# Patient Record
Sex: MALE | Race: WHITE | NOT HISPANIC OR LATINO | Employment: FULL TIME | URBAN - NONMETROPOLITAN AREA
[De-identification: names, ages, dates, MRNs, and addresses within clinical notes are randomized per-mention and may not be internally consistent; named-entity substitution may affect disease eponyms.]

---

## 2017-05-10 ENCOUNTER — COMMUNICATION - GICH (OUTPATIENT)
Dept: FAMILY MEDICINE | Facility: OTHER | Age: 32
End: 2017-05-10

## 2017-05-10 DIAGNOSIS — I10 ESSENTIAL (PRIMARY) HYPERTENSION: ICD-10-CM

## 2017-12-07 ENCOUNTER — OFFICE VISIT - GICH (OUTPATIENT)
Dept: FAMILY MEDICINE | Facility: OTHER | Age: 32
End: 2017-12-07

## 2017-12-07 ENCOUNTER — HISTORY (OUTPATIENT)
Dept: FAMILY MEDICINE | Facility: OTHER | Age: 32
End: 2017-12-07

## 2017-12-07 ENCOUNTER — HOSPITAL ENCOUNTER (OUTPATIENT)
Dept: RADIOLOGY | Facility: OTHER | Age: 32
End: 2017-12-07
Attending: NURSE PRACTITIONER

## 2017-12-07 DIAGNOSIS — S69.91XA UNSPECIFIED INJURY OF RIGHT WRIST, HAND AND FINGER(S), INITIAL ENCOUNTER: ICD-10-CM

## 2017-12-07 DIAGNOSIS — S62.639A CLOSED DISPLACED FRACTURE OF DISTAL PHALANX OF FINGER: ICD-10-CM

## 2018-01-05 NOTE — TELEPHONE ENCOUNTER
Patient Information     Patient Name MRN Sex Jax Baker [id number] Male 1985      Telephone Encounter by Jax Vora RN at 2017  9:35 AM     Author:  Jax Vora RN Service:  (none) Author Type:  NURS- Registered Nurse     Filed:  2017  9:45 AM Encounter Date:  5/10/2017 Status:  Signed     :  Jax Vora RN (NURS- Registered Nurse)            This is a Refill request from: [facility]  Name of Medication: Lisinopril  Quantity requested: 90 tabs  Last fill date: 16 per rx request  Due for refill: Yes, per rx request  Last visit with ANMOL FRANKI was on: 2016 in [facility]  PCP:  No Pcp  Controlled Substance Agreement:  N/A   Diagnosis r/t this medication request: Hypertension    Chart review shows that requested rx was discontinued in chart on 10/19/16 as patient states no longer taking med. Last office visit with PCP noted to be on 16, for an establish care visit. Lisinopril was continued at that office visit. Will send patient a reminder letter that he should be seen for an annual office visit, as well as route limited refill to PCP for his consideration/approval.     Unable to complete prescription refill per RN Medication Refill Policy.................... Jax Vora RN ....................  2017   9:37 AM

## 2018-01-10 ENCOUNTER — COMMUNICATION - GICH (OUTPATIENT)
Dept: FAMILY MEDICINE | Facility: OTHER | Age: 33
End: 2018-01-10

## 2018-01-10 DIAGNOSIS — I10 ESSENTIAL (PRIMARY) HYPERTENSION: ICD-10-CM

## 2018-01-30 ENCOUNTER — DOCUMENTATION ONLY (OUTPATIENT)
Dept: FAMILY MEDICINE | Facility: OTHER | Age: 33
End: 2018-01-30

## 2018-01-30 PROBLEM — E66.9 OBESITY: Status: ACTIVE | Noted: 2018-01-30

## 2018-01-30 RX ORDER — EPINEPHRINE 0.3 MG/.3ML
0.3 INJECTION SUBCUTANEOUS
COMMUNITY
Start: 2017-09-09

## 2018-01-30 RX ORDER — LISINOPRIL 10 MG/1
1 TABLET ORAL DAILY
COMMUNITY
Start: 2018-01-11 | End: 2020-07-07

## 2018-02-09 VITALS
HEIGHT: 70 IN | WEIGHT: 272.25 LBS | SYSTOLIC BLOOD PRESSURE: 140 MMHG | TEMPERATURE: 96.6 F | BODY MASS INDEX: 38.98 KG/M2 | HEART RATE: 88 BPM | DIASTOLIC BLOOD PRESSURE: 80 MMHG

## 2018-02-12 NOTE — PROGRESS NOTES
Patient Information     Patient Name MRN Sex Jax Baker [id number] Male 1985      Progress Notes by Laura Jackman NP at 2017  6:15 PM     Author:  Laura Jackman NP Service:  (none) Author Type:  PHYS- Nurse Practitioner     Filed:  2017  7:46 PM Encounter Date:  2017 Status:  Signed     :  Laura Jackman NP (PHYS- Nurse Practitioner)            Nursing Notes:   Gloria Alcaraz  2017  6:43 PM  Signed  Patient presents to the clinic for right ring finger injury. Patient reports his finger getting smashed by a log around 11:30 this morning.   Gloria GOODWIN CMA.......2017..6:23 PM      SUBJECTIVE:    Jax Rojo is a 32 y.o. male who presents for finger pain    Hand Injury    Incident onset: 1130 today, 17. The incident occurred at work (He states it happened at work today, but is not going to file w/c). The injury mechanism was a direct blow (Finger crushed under a log). The pain is present in the right fingers. The quality of the pain is described as aching and shooting. The pain radiates to the right hand. The pain is at a severity of 8/10. The pain has been constant since the incident. Pertinent negatives include no chest pain, muscle weakness, numbness or tingling. Associated symptoms comments: Finger swelling and pain, wants the pressure relieved. . The symptoms are aggravated by palpation, lifting and movement. He has tried nothing for the symptoms. The treatment provided no relief.       Current Outpatient Prescriptions on File Prior to Visit       Medication  Sig Dispense Refill     lisinopril (PRINIVIL; ZESTRIL) 10 mg tablet TAKE 1 TABLET BY MOUTH EVERY DAY. 90 tablet 0     No current facility-administered medications on file prior to visit.        REVIEW OF SYSTEMS:  Review of Systems   Cardiovascular: Negative for chest pain.   Neurological: Negative for tingling and numbness.       OBJECTIVE:  /80  Pulse 88  Temp 96.6  F (35.9  C)  "(Tympanic)  Ht 1.778 m (5' 10\")  Wt 123.5 kg (272 lb 4 oz)  BMI 39.06 kg/m2    EXAM:   Physical Exam   Constitutional: He is well-developed, well-nourished, and in no distress.   HENT:   Head: Normocephalic and atraumatic.   Eyes: Conjunctivae are normal.   Neck: Neck supple.   Cardiovascular: Normal rate.    Pulmonary/Chest: Effort normal. No respiratory distress.   Musculoskeletal:        Right hand: He exhibits decreased range of motion, tenderness and swelling. Normal sensation noted. Decreased strength noted.        Hands:  Neurological: He is alert.   Skin: Skin is warm and dry. No rash noted.   Psychiatric: Mood and affect normal.   Nursing note and vitals reviewed.    Completed Finger xray.  I personally reviewed the xray. There was a tuft fracture of the 4th finger, distal end upon initial read of xray.  Final read pending by radiology.    ASSESSMENT/PLAN:    ICD-10-CM    1. Injury of finger of right hand, initial encounter S69.91XA XR FINGER 3 VIEWS RIGHT   2. Closed fracture of tuft of distal phalanx of finger S62.639A         Plan:  Finger splinted. Home cares and OTC gone over. Discussed with him there was no way to relieve the pressure in the finger. If the nail was opened up, then the fracture would be an open fracture leading to potential infection and sepsis. I am not going to do open the nail up. I also advised him to leave the nail alone. This injury did happen at work, but he was not going to file it under w/c.       SHANNAN QUINONES NP ....................  12/7/2017   7:45 PM            "

## 2018-02-12 NOTE — PATIENT INSTRUCTIONS
Patient Information     Patient Name MRN Sex Jax Baker [id number] Male 1985      Patient Instructions by Laura Jackman NP at 2017  6:15 PM     Author:  Laura Jackman NP Service:  (none) Author Type:  PHYS- Nurse Practitioner     Filed:  2017  7:09 PM Encounter Date:  2017 Status:  Signed     :  Laura Jackman NP (PHYS- Nurse Practitioner)            Finger Fracture   ________________________________________________________________________  KEY POINTS    A finger fracture is a crack or break in one or more of the bones in a finger.    Treatment may include a splint, cast, janee-taping, or surgery, and special exercises to help your finger get stronger and more flexible.    Follow the full course of treatment your healthcare provider prescribes.  ________________________________________________________________________  What is a finger fracture?   A finger fracture is a crack or break in one or more of the bones in a finger. The break may be just a bend or small crack in the bone, or the bone may break into pieces or shatter. Some fractures may stick out through the skin.  What is the cause?  A broken finger usually happens from:    Hitting or being hit by a hard object    Getting a finger slammed in a door    Falling onto the hand  A fracture may also be the result of a medical condition that causes weak or brittle bones.  What are the symptoms?   Symptoms may include:    A snapping or popping sound at the time of the injury    Pain, swelling, bruising, or tenderness that happens right after the injury    Pain when the injured area is touched    Pain or swelling that keeps you from bending or using your finger    An area of the finger that is cold, pale, or numb    A change in the shape of the finger  How is it diagnosed?   Your healthcare provider will ask about your symptoms and how the injury happened. Your provider will examine you. Tests may include:    X-rays  "of the finger    CT scan, which uses X-rays and a computer to show detailed pictures of the bones    MRI, which uses a strong magnetic field and radio waves to show detailed pictures of the bones  How is it treated?   The treatment depends on the type of fracture.     If you have an open wound with the fracture, you may need treatment to control bleeding or prevent infection.    If the broken bone is crooked, your healthcare provider will straighten it. You will be given medicine first so the straightening is less painful.    Sometimes surgery is needed to put the bones back into the right position.    Your healthcare provider may put a splint or cast on your finger, or the finger may be \"janee taped\" to the finger next to it.  With treatment, the fracture may take 4 to 6 weeks to heal. You may need to do special exercises to help your finger get stronger and more flexible. Ask your healthcare provider about this.  How can I take care of myself?  Follow the full course of treatment your healthcare provider prescribes. Also:     To keep swelling down and help relieve pain, your healthcare provider may tell you to:    Put an ice pack, gel pack, or package of frozen vegetables wrapped in a cloth on the injured area every 3 to 4 hours for up to 20 minutes at a time for the first day or two after the injury.    Keep the hand up on pillows so that it is above the level of your heart when you sit or lie down. Your provider may also recommend using a sling to keep the hand up while you do your daily activities.    Take pain medicine, such as ibuprofen, as directed by your provider. Nonsteroidal anti-inflammatory medicines (NSAIDs), such as ibuprofen, may cause stomach bleeding and other problems. These risks increase with age. Read the label and take as directed. Unless recommended by your healthcare provider, you should not take this medicine for more than 10 days.  Ask your healthcare provider:    How and when you will " get your test results    How long it will take to recover    If there are activities you should avoid and when you can return to your normal activities    How to take care of yourself at home    What symptoms or problems you should watch for and what to do if you have them  Make sure you know when you should come back for a checkup. Keep all appointments for provider visits or tests.  How can I help prevent a finger fracture?  Most broken fingers are caused by accidents that are not easy to prevent.

## 2018-02-12 NOTE — TELEPHONE ENCOUNTER
Patient Information     Patient Name MRN Sex Jax Baker [id number] Male 1985      Telephone Encounter by Jax Vora RN at 2018  2:18 PM     Author:  Jax Vora RN Service:  (none) Author Type:  NURS- Registered Nurse     Filed:  2018  2:25 PM Encounter Date:  1/10/2018 Status:  Signed     :  Jax Vora RN (NURS- Registered Nurse)            Ace Inhibitors    Office visit in the past 12 months or per provider note.    Last visit with FRANKI COREA was on: 2016 in [facility]  Next visit with FRANKI COREA is on: No future appointment listed with this provider  Next visit with Family Practice is on: No future appointment listed in this department    Lab test requirements:  Creatinine and Potassium annually, if ordering lab, order BMP.  CREATININE (mg/dL)    Date Value   2016 0.77     POTASSIUM (mmol/L)    Date Value   2016 4.0     Max refill for 12 months from last office visit or per provider note    Chart review shows that patient has not had labwork or seen PCP in 24 months to support rx as requested. Was sent a reminder letter with last refill of rx as requested on 5/10/17. No appointment is noted to be scheduled at this time. Call placed to patient to discuss. Patient was unavailable at this time. Writer will laureano up rx as requested and will route rx request to PCP for his consideration/approval at this time.    Unable to complete prescription refill per RN Medication Refill Policy.................... Jax Vora RN ....................  2018   2:24 PM

## 2018-02-12 NOTE — NURSING NOTE
Patient Information     Patient Name MRN Sex Jax Baker [id number] Male 1985      Nursing Note by Gloria Alcaraz at 2017  6:15 PM     Author:  Gloria Alcaraz Service:  (none) Author Type:  (none)     Filed:  2017  6:43 PM Encounter Date:  2017 Status:  Signed     :  Gloria Alcaraz            Patient presents to the clinic for right ring finger injury. Patient reports his finger getting smashed by a log around 11:30 this morning.   Gloria GOODWIN, EMBER.......2017..6:23 PM

## 2018-06-05 DIAGNOSIS — B18.2 CHRONIC HEPATITIS C WITHOUT HEPATIC COMA (H): Primary | ICD-10-CM

## 2018-06-10 ENCOUNTER — APPOINTMENT (OUTPATIENT)
Dept: GENERAL RADIOLOGY | Facility: OTHER | Age: 33
End: 2018-06-10
Attending: INTERNAL MEDICINE
Payer: COMMERCIAL

## 2018-06-10 ENCOUNTER — HOSPITAL ENCOUNTER (EMERGENCY)
Facility: OTHER | Age: 33
Discharge: HOME | End: 2018-06-10
Attending: INTERNAL MEDICINE | Admitting: INTERNAL MEDICINE
Payer: COMMERCIAL

## 2018-06-10 VITALS
OXYGEN SATURATION: 96 % | DIASTOLIC BLOOD PRESSURE: 99 MMHG | TEMPERATURE: 98 F | RESPIRATION RATE: 14 BRPM | SYSTOLIC BLOOD PRESSURE: 165 MMHG

## 2018-06-10 DIAGNOSIS — B18.2 CHRONIC HEPATITIS C WITHOUT HEPATIC COMA (H): ICD-10-CM

## 2018-06-10 DIAGNOSIS — S42.022A CLOSED DISPLACED FRACTURE OF SHAFT OF LEFT CLAVICLE, INITIAL ENCOUNTER: ICD-10-CM

## 2018-06-10 DIAGNOSIS — T07.XXXA MULTIPLE ABRASIONS: ICD-10-CM
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
## 2018-06-10 LAB
ALBUMIN SERPL-MCNC: 4.4 G/DL (ref 3.5–5.7)
ALP SERPL-CCNC: 81 U/L (ref 34–104)
ALT SERPL W P-5'-P-CCNC: 42 U/L (ref 7–52)
ANION GAP SERPL CALCULATED.3IONS-SCNC: 8 MMOL/L (ref 3–14)
APTT PPP: 25 SEC (ref 29–50)
AST SERPL W P-5'-P-CCNC: 30 U/L (ref 13–39)
BASOPHILS # BLD AUTO: 0 10E9/L (ref 0–0.2)
BASOPHILS NFR BLD AUTO: 0.1 %
BILIRUB SERPL-MCNC: 0.4 MG/DL (ref 0.3–1)
BUN SERPL-MCNC: 11 MG/DL (ref 7–25)
CALCIUM SERPL-MCNC: 9.1 MG/DL (ref 8.6–10.3)
CHLORIDE SERPL-SCNC: 102 MMOL/L (ref 98–107)
CO2 SERPL-SCNC: 25 MMOL/L (ref 21–31)
CREAT SERPL-MCNC: 0.97 MG/DL (ref 0.7–1.3)
DIFFERENTIAL METHOD BLD: ABNORMAL
EOSINOPHIL # BLD AUTO: 0.1 10E9/L (ref 0–0.7)
EOSINOPHIL NFR BLD AUTO: 0.3 %
ERYTHROCYTE [DISTWIDTH] IN BLOOD BY AUTOMATED COUNT: 11.9 % (ref 10–15)
ETHANOL SERPL-MCNC: <0.01 %
GFR SERPL CREATININE-BSD FRML MDRD: 89 ML/MIN/1.7M2
GLUCOSE SERPL-MCNC: 153 MG/DL (ref 70–105)
HCT VFR BLD AUTO: 43.5 % (ref 40–53)
HGB BLD-MCNC: 15.5 G/DL (ref 13.3–17.7)
IMM GRANULOCYTES # BLD: 0.2 10E9/L (ref 0–0.4)
IMM GRANULOCYTES NFR BLD: 1.2 %
INR PPP: 1.06 (ref 0–1.3)
LYMPHOCYTES # BLD AUTO: 1.6 10E9/L (ref 0.8–5.3)
LYMPHOCYTES NFR BLD AUTO: 10.4 %
MCH RBC QN AUTO: 33.3 PG (ref 26.5–33)
MCHC RBC AUTO-ENTMCNC: 35.6 G/DL (ref 31.5–36.5)
MCV RBC AUTO: 93 FL (ref 78–100)
MONOCYTES # BLD AUTO: 0.7 10E9/L (ref 0–1.3)
MONOCYTES NFR BLD AUTO: 4.4 %
NEUTROPHILS # BLD AUTO: 12.4 10E9/L (ref 1.6–8.3)
NEUTROPHILS NFR BLD AUTO: 83.6 %
PLATELET # BLD AUTO: 162 10E9/L (ref 150–450)
POTASSIUM SERPL-SCNC: 3.7 MMOL/L (ref 3.5–5.1)
PROT SERPL-MCNC: 7.2 G/DL (ref 6.4–8.9)
RBC # BLD AUTO: 4.66 10E12/L (ref 4.4–5.9)
SODIUM SERPL-SCNC: 135 MMOL/L (ref 134–144)
WBC # BLD AUTO: 14.9 10E9/L (ref 4–11)

## 2018-06-10 PROCEDURE — 90471 IMMUNIZATION ADMIN: CPT | Performed by: INTERNAL MEDICINE

## 2018-06-10 PROCEDURE — 96375 TX/PRO/DX INJ NEW DRUG ADDON: CPT | Performed by: INTERNAL MEDICINE

## 2018-06-10 PROCEDURE — 85610 PROTHROMBIN TIME: CPT | Performed by: INTERNAL MEDICINE

## 2018-06-10 PROCEDURE — 25000128 H RX IP 250 OP 636: Performed by: INTERNAL MEDICINE

## 2018-06-10 PROCEDURE — 80053 COMPREHEN METABOLIC PANEL: CPT | Performed by: INTERNAL MEDICINE

## 2018-06-10 PROCEDURE — 85730 THROMBOPLASTIN TIME PARTIAL: CPT | Performed by: INTERNAL MEDICINE

## 2018-06-10 PROCEDURE — 90714 TD VACC NO PRESV 7 YRS+ IM: CPT | Performed by: INTERNAL MEDICINE

## 2018-06-10 PROCEDURE — 96374 THER/PROPH/DIAG INJ IV PUSH: CPT | Performed by: INTERNAL MEDICINE

## 2018-06-10 PROCEDURE — 73030 X-RAY EXAM OF SHOULDER: CPT | Mod: LT

## 2018-06-10 PROCEDURE — 99285 EMERGENCY DEPT VISIT HI MDM: CPT | Mod: 25 | Performed by: INTERNAL MEDICINE

## 2018-06-10 PROCEDURE — 25000132 ZZH RX MED GY IP 250 OP 250 PS 637: Performed by: INTERNAL MEDICINE

## 2018-06-10 PROCEDURE — 73630 X-RAY EXAM OF FOOT: CPT | Mod: LT

## 2018-06-10 PROCEDURE — 99285 EMERGENCY DEPT VISIT HI MDM: CPT | Mod: Z6 | Performed by: INTERNAL MEDICINE

## 2018-06-10 PROCEDURE — 71045 X-RAY EXAM CHEST 1 VIEW: CPT

## 2018-06-10 PROCEDURE — 25000125 ZZHC RX 250: Performed by: INTERNAL MEDICINE

## 2018-06-10 PROCEDURE — 85025 COMPLETE CBC W/AUTO DIFF WBC: CPT | Performed by: INTERNAL MEDICINE

## 2018-06-10 PROCEDURE — 80320 DRUG SCREEN QUANTALCOHOLS: CPT | Performed by: INTERNAL MEDICINE

## 2018-06-10 PROCEDURE — 36415 COLL VENOUS BLD VENIPUNCTURE: CPT | Performed by: INTERNAL MEDICINE

## 2018-06-10 PROCEDURE — 96376 TX/PRO/DX INJ SAME DRUG ADON: CPT | Performed by: INTERNAL MEDICINE

## 2018-06-10 RX ORDER — FENTANYL CITRATE 50 UG/ML
100 INJECTION, SOLUTION INTRAMUSCULAR; INTRAVENOUS ONCE
Status: COMPLETED | OUTPATIENT
Start: 2018-06-10 | End: 2018-06-10

## 2018-06-10 RX ORDER — ONDANSETRON 2 MG/ML
4 INJECTION INTRAMUSCULAR; INTRAVENOUS ONCE
Status: COMPLETED | OUTPATIENT
Start: 2018-06-10 | End: 2018-06-10

## 2018-06-10 RX ORDER — MUPIROCIN 20 MG/G
OINTMENT TOPICAL ONCE
Status: COMPLETED | OUTPATIENT
Start: 2018-06-10 | End: 2018-06-10

## 2018-06-10 RX ORDER — FENTANYL CITRATE 50 UG/ML
50 INJECTION, SOLUTION INTRAMUSCULAR; INTRAVENOUS ONCE
Status: DISCONTINUED | OUTPATIENT
Start: 2018-06-10 | End: 2018-06-10

## 2018-06-10 RX ORDER — GINSENG 100 MG
500 CAPSULE ORAL ONCE
Status: DISCONTINUED | OUTPATIENT
Start: 2018-06-10 | End: 2018-06-10

## 2018-06-10 RX ORDER — FENTANYL CITRATE 50 UG/ML
100 INJECTION, SOLUTION INTRAMUSCULAR; INTRAVENOUS ONCE
Status: DISCONTINUED | OUTPATIENT
Start: 2018-06-10 | End: 2018-06-11 | Stop reason: HOSPADM

## 2018-06-10 RX ORDER — CEPHALEXIN 500 MG/1
500 CAPSULE ORAL 3 TIMES DAILY
Qty: 30 CAPSULE | Refills: 0 | Status: SHIPPED | OUTPATIENT
Start: 2018-06-10 | End: 2018-10-01

## 2018-06-10 RX ORDER — FENTANYL CITRATE 50 UG/ML
100 INJECTION, SOLUTION INTRAMUSCULAR; INTRAVENOUS ONCE
Status: DISCONTINUED | OUTPATIENT
Start: 2018-06-10 | End: 2018-06-10

## 2018-06-10 RX ADMIN — CEPHALEXIN 500 MG: 250 CAPSULE ORAL at 21:45

## 2018-06-10 RX ADMIN — LIDOCAINE HYDROCHLORIDE 20 ML: 20 JELLY TOPICAL at 20:25

## 2018-06-10 RX ADMIN — ONDANSETRON 4 MG: 2 INJECTION INTRAMUSCULAR; INTRAVENOUS at 19:43

## 2018-06-10 RX ADMIN — MUPIROCIN: 20 OINTMENT TOPICAL at 21:24

## 2018-06-10 RX ADMIN — Medication 3 ML: at 20:35

## 2018-06-10 RX ADMIN — FENTANYL CITRATE 100 MCG: 50 INJECTION, SOLUTION INTRAMUSCULAR; INTRAVENOUS at 19:42

## 2018-06-10 RX ADMIN — CLOSTRIDIUM TETANI TOXOID ANTIGEN (FORMALDEHYDE INACTIVATED) AND CORYNEBACTERIUM DIPHTHERIAE TOXOID ANTIGEN (FORMALDEHYDE INACTIVATED) 0.5 ML: 5; 2 INJECTION, SUSPENSION INTRAMUSCULAR at 19:45

## 2018-06-10 RX ADMIN — FENTANYL CITRATE 100 MCG: 50 INJECTION, SOLUTION INTRAMUSCULAR; INTRAVENOUS at 20:25

## 2018-06-10 ASSESSMENT — ENCOUNTER SYMPTOMS
WOUND: 1
BRUISES/BLEEDS EASILY: 0
ADENOPATHY: 0
CONFUSION: 0
AGITATION: 0
ABDOMINAL PAIN: 0
BACK PAIN: 0
HEMATURIA: 0
SHORTNESS OF BREATH: 0
FATIGUE: 0
NECK PAIN: 0
FEVER: 0
HEADACHES: 0
NECK STIFFNESS: 0

## 2018-06-10 NOTE — ED AVS SNAPSHOT
[facility]    [address]    [place] [address]    Phone:  [phone]    Fax:  [phone]                                       Jax Rojo   MRN: [id number]    Department:  [facility]   Date of Visit:  6/10/2018           After Visit Summary Signature Page     I have received my discharge instructions, and my questions have been answered. I have discussed any challenges I see with this plan with the nurse or doctor.    ..........................................................................................................................................  Patient/Patient Representative Signature      ..........................................................................................................................................  Patient Representative Print Name and Relationship to Patient    ..................................................               ................................................  Date                                            Time    ..........................................................................................................................................  Reviewed by Signature/Title    ...................................................              ..............................................  Date                                                            Time
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
## 2018-06-11 NOTE — ED NOTES
COLUMBIA-SUICIDE SEVERITY RATING SCALE   Screen with Triage Points for Emergency Department      Ask questions that are bolded and underlined.   Past  month   Ask Questions 1 and 2 YES NO   1)  Have you wished you were dead or wished you could go to sleep and not wake up?   n   2)  Have you actually had any thoughts of killing yourself?   n   If YES to 2, ask questions 3, 4, 5, and 6.  If NO to 2, go directly to question 6.   3)  Have you been thinking about how you might do this?   E.g.  I thought about taking an overdose but I never made a specific plan as to when where or how I would actually do it .and I would never go through with it.       4)  Have you had these thoughts and had some intention of acting on them?   As opposed to  I have the thoughts but I definitely will not do anything about them.       5)  Have you started to work out or worked out the details of how to kill yourself? Do you intend to carry out this plan?      6)  Have you ever done anything, started to do anything, or prepared to do anything to end your life?  Examples: Collected pills, obtained a gun, gave away valuables, wrote a will or suicide note, took out pills but didn t swallow any, held a gun but changed your mind or it was grabbed from your hand, went to the roof but didn t jump; or actually took pills, tried to shoot yourself, cut yourself, tried to hang yourself, etc.    If YES, ask: Was this within the past three months?  Lifetime     n    Past 3 Months        Item 1:  Behavioral Health Referral at Discharge  Item 2:  Behavioral Health Referral at Discharge   Item 3:  Behavioral Health Consult (Psychiatric Nurse/) and consider Patient Safety Precautions  Item 4:  Immediate Notification of Physician and/or Behavioral Health and Patient Safety Precautions   Item 5:  Immediate Notification of Physician and/or Behavioral Health and Patient Safety Precautions  Item 6:  Over 3 months ago: Behavioral Health Consult  (Psychiatric Nurse/) and consider Patient Safety Precautions  OR  Item 6:  3 months ago or less: Immediate Notification of Physician and/or Behavioral Health and Patient Safety Precautions

## 2018-06-11 NOTE — DISCHARGE INSTRUCTIONS
Orthopedic referral sent  - they will call with date/time of appointment.   Wound care clinic referral sent  - they will call with date/time of appointment.     6 tablets of 5 mg hydrocodone prescription printed --take 1 or 2 tablets at bedtime as needed for pain.        Having Clavicle Fracture Open Reduction and Internal Fixation (ORIF)  Open reduction and internal fixation (ORIF) is a type of treatment to fix a broken bone. It puts the pieces of a broken bone back together so they can heal. Open reduction means the bones are put back in place during a surgery. Internal fixation means that special hardware is used to hold the bone pieces together. This helps the bone heal correctly. The procedure is done by an orthopedic surgeon. This is a doctor with special training in treating bone, joint, and muscle problems.  What to tell your healthcare provider  Make sure you tell the healthcare provider about all medicines you take, including over-the-counter medicines, such as aspirin. Tell him or her about all vitamins, herbs, and other supplements you take. Also tell the provider the last time you had something to eat or drink. And tell your healthcare provider if you:    Have had any recent changes in your health, such as an illness or fever    Are sensitive or allergic to any medicines, latex, tape, or anesthesia (local and general)    Are pregnant or think you may be pregnant  Getting ready for your surgery  ORIF often takes place as emergency surgery after an accident or injury. Before this procedure, a healthcare provider will ask about your health history and give you a physical exam. An X-ray may be done to look at your clavicle.  In some cases, clavicle fracture ORIF is planned. You may need to stop taking some medicines before the surgery, such as blood thinners and aspirin. If you smoke, you may need to stop before your surgery. Smoking can delay healing. Talk with your healthcare provider if you need help to  stop smoking.  Also, make sure to:    Ask a family member or friend to take you home from the hospital. You cannot drive yourself.    Plan some changes at home to help you recover. You may need help at home after the surgery.    Not eat or drink after midnight the night before your surgery.    Follow all other instructions from your healthcare provider.  You may be asked to sign a consent form that gives your permission to do the surgery. Read the form carefully. Ask questions if something is not clear.  On the day of surgery  Your surgeon will explain the details of your surgery. These details will depend on where your injury is and how bad it is. An orthopedic surgeon and a team of specialized nurses will do the surgery. The preparation and surgery may take a couple of hours. In general, you can expect the following:    You will likely have general anesthesia.This will prevent pain and make you sleep through the surgery. Or you may have local anesthesia to numb the area and medicine to help you relax and sleep through the surgery.    A healthcare provider watches your vital signs, like your heart rate and blood pressure, during the surgery.    After cleaning the skin, your surgeon will make a cut (incision) through the skin and muscle around your clavicle.    The surgeon will put the pieces of your broken clavicle bones back into place (reduction).    The surgeon will secure the pieces of the broken bones to each other (fixation). He or she may use screws, metal plates, wires, or pins.    Other repairs are made to the area as needed.    The surgeon will close the layers of muscle and skin around your clavicle with stitches (sutures).  After your surgery  Talk to your surgeon about what you can expect after your surgery. You may be able to go home the same day. Or you may need to stay in the hospital overnight. Before leaving the hospital, you will likely have X-rays taken of your clavicle. This is to check the  repair.  You will have some pain after the surgery. Your doctor will tell you what pain medicine you can take to help reduce the pain. Avoid certain over-the-counter medicines for pain as instructed. Some of these may interfere with bone healing. You can also use ice packs to help lessen pain and swelling.  For a while after your surgery, you must be careful not to move your clavicle at all. This may mean you'll need to wear a splint for several weeks. A splint will help you keep your arm from moving. Make sure your splint does not get wet. Your health care provider will tell you when it is safe to move your arm again.  Your surgeon may also tell you to eat foods high in calcium and vitamin D to help with bone healing. You may need to take medicine called a blood thinner for a little while after your surgery. Blood thinners stop blood from clotting or clumping together. Follow all your doctor s instructions carefully.  Follow-up care  Make sure to go to all of your follow-up visits. You may need to have your stitches removed a week or so after your surgery.  You may have physical therapy to improve the strength and movement of your muscles. The therapy may include treatments and exercises. The therapy improves your chances of a full recovery. Most people are able to return to all their normal activities within a few months.  When to call your healthcare provider  Call your healthcare provider if you have any of the following:    Fever of 100.4 F (38 C) or higher    Redness, swelling, or fluid leaking from your incision that gets worse    Pain that gets worse    Loss of feeling anywhere in your body   Date Last Reviewed: 8/10/2015    2677-3090 [employer]. [address] [place] [address], [place] [address]. All rights reserved. This information is not intended as a substitute for professional medical care. Always follow your healthcare professional's instructions.

## 2018-06-11 NOTE — ED NOTES
Abrasion on top of head cleansed with hibacleanse prior to discharge. No dressing. Bleeding controlled. Pt tolerated. CMS remained intact to all extremities upon discharge.   Wound care explained to pt and spouse with verbal understanding. Sling to left arm, ice applied to left shoulder. Pt ambulatory at time of discharge.

## 2018-06-11 NOTE — ED NOTES
uro jet applied to left arm, shoulder, hip, lower leg, foot and area was then cleansed with hibacleanse and surgical sponge. Pt tolerated procedure. Left large toe had LET gel applied and laceration/abrasion was irrigated out with 1 L of normal saline. Pt was able to distract himself throughout procedure. Provider is in assessing toe. Wounds dressed with bactroban ointment, vasline gauze, abd, and medi-pore tape.
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
## 2018-07-24 NOTE — PROGRESS NOTES
Patient Information     Patient Name  Jax Rojo MRN  [id number] Sex  Male   1985      Letter by Tommy Hernandez MD at      Author:  Tommy Hernandez MD Service:  (none) Author Type:  (none)    Filed:   Encounter Date:  5/10/2017 Status:  (Other)           Jax Rojo  [address]  [facility] [address]          May 11, 2017    Dear Mr. Rojo:    This is to remind you that you are due for your 1 year annual medication management appointment with Dr. Hernandez.  Your last visit was on 16. Additional refills of your medication require you to complete this visit.    Please call [phone] to schedule your appointment.    Thank you for choosing [facility] for your health care needs.    Sincerely,      Refill RN  [facility]

## 2018-10-01 ENCOUNTER — HOSPITAL ENCOUNTER (EMERGENCY)
Facility: OTHER | Age: 33
Discharge: HOME | End: 2018-10-01
Attending: EMERGENCY MEDICINE | Admitting: EMERGENCY MEDICINE
Payer: COMMERCIAL

## 2018-10-01 VITALS
RESPIRATION RATE: 16 BRPM | OXYGEN SATURATION: 99 % | HEIGHT: 70 IN | WEIGHT: 279.4 LBS | BODY MASS INDEX: 40 KG/M2 | DIASTOLIC BLOOD PRESSURE: 60 MMHG | TEMPERATURE: 97.6 F | SYSTOLIC BLOOD PRESSURE: 119 MMHG

## 2018-10-01 DIAGNOSIS — S67.02XA CRUSHING INJURY OF LEFT THUMB, INITIAL ENCOUNTER: ICD-10-CM

## 2018-10-01 DIAGNOSIS — S69.92XA INJURY OF NAIL BED OF FINGER, LEFT, INITIAL ENCOUNTER: ICD-10-CM

## 2018-10-01 PROCEDURE — 99284 EMERGENCY DEPT VISIT MOD MDM: CPT | Performed by: EMERGENCY MEDICINE

## 2018-10-01 PROCEDURE — 99283 EMERGENCY DEPT VISIT LOW MDM: CPT | Mod: Z6 | Performed by: EMERGENCY MEDICINE

## 2018-10-01 ASSESSMENT — ENCOUNTER SYMPTOMS
SHORTNESS OF BREATH: 0
CHILLS: 0
NAUSEA: 0
CHEST TIGHTNESS: 0
ARTHRALGIAS: 1
VOMITING: 0
AGITATION: 0
FEVER: 0
WOUND: 1
DYSURIA: 0
LIGHT-HEADEDNESS: 0

## 2018-10-01 NOTE — ED AVS SNAPSHOT
[facility]    [address]    [place] [address]    Phone:  [phone]    Fax:  [phone]                                       Jax Rojo   MRN: [id number]    Department:  [facility]   Date of Visit:  10/1/2018           After Visit Summary Signature Page     I have received my discharge instructions, and my questions have been answered. I have discussed any challenges I see with this plan with the nurse or doctor.    ..........................................................................................................................................  Patient/Patient Representative Signature      ..........................................................................................................................................  Patient Representative Print Name and Relationship to Patient    ..................................................               ................................................  Date                                   Time    ..........................................................................................................................................  Reviewed by Signature/Title    ...................................................              ..............................................  Date                                               Time          22EPIC Rev 08/18

## 2018-10-01 NOTE — ED AVS SNAPSHOT
[facility]    [address]    [place] [address]    Phone:  [phone]    Fax:  [phone]                                       Jax Rojo   MRN: [id number]    Department:  [facility]   Date of Visit:  10/1/2018           Patient Information     Date Of Birth          1985        Your diagnoses for this visit were:     Crushing injury of left thumb, initial encounter     Injury of nail bed of finger, left, initial encounter        You were seen by Rohan Chavez MD.        Discharge Instructions       Watch for any signs of infection, which would include increased redness pain or swelling.  You might noticed some puslike drainage coming out.  You also might notice some red streaks going up the thumb or the wrist.  If you notice anything like this you need to get in and get it checked out right away.    24 Hour Appointment Hotline     To schedule an appointment at [facility], please call [phone]. If you don't have a family doctor or clinic, we will help you find one. [facility] clinics are conveniently located to serve the needs of you and your family.           Review of your medicines      Our records show that you are taking the medicines listed below. If these are incorrect, please call your family doctor or clinic.        Dose / Directions Last dose taken    EPINEPHrine 0.3 MG/0.3ML injection 2-pack   Commonly known as:  EPIPEN/ADRENACLICK/or ANY BX GENERIC EQUIV   Dose:  0.3 mg        Inject 0.3 mg into the muscle   Refills:  0        lisinopril 10 MG tablet   Commonly known as:  PRINIVIL/ZESTRIL   Dose:  1 tablet        Take 1 tablet by mouth daily   Refills:  0                Orders Needing Specimen Collection     None      Pending Results     No orders found from 9/29/2018 to 10/2/2018.            Pending Culture Results     No orders found from 9/29/2018 to 10/2/2018.            Pending Results Instructions     If you had any lab  "results that were not finalized at the time of your Discharge, you can call the ED Lab Result RN at [phone]. You will be contacted by this team for any positive Lab results or changes in treatment. The nurses are available 7 days a week from 10A to 6:30P.  You can leave a message 24 hours per day and they will return your call.        Thank you for choosing [facility]       Thank you for choosing [facility] for your care. Our goal is always to provide you with excellent care. Hearing back from our patients is one way we can continue to improve our services. Please take a few minutes to complete the written survey that you may receive in the mail after you visit with us. Thank you!        [employer] Information     [employer] lets you send messages to your doctor, view your test results, renew your prescriptions, schedule appointments and more. To sign up, go to [email or web address] . Click on \"Log in\" on the left side of the screen, which will take you to the Welcome page. Then click on \"Sign up Now\" on the right side of the page.     You will be asked to enter the access code listed below, as well as some personal information. Please follow the directions to create your username and password.     Your access code is: [email or web address]  Expires: 2018  8:36 PM     Your access code will  in 90 days. If you need help or a new code, please call your [facility] clinic or [phone].        Care EveryWhere ID     This is your Care EveryWhere ID. This could be used by other organizations to access your [facility] medical records  [id number]        Equal Access to Services     RICH MELGOZA : Hadii armani Jc, waestuardo mills, qaybta kaalpascual mccray . So [facility] [phone].    ATENCIÓN: Si habla español, tiene a castle disposición servicios gratuitos de asistencia lingüística. Llame al [phone].    We comply with applicable federal civil rights laws and [place] " laws. We do not discriminate on the basis of race, color, national origin, age, disability, sex, sexual orientation, or gender identity.            After Visit Summary       This is your record. Keep this with you and show to your community pharmacist(s) and doctor(s) at your next visit.
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
## 2018-10-02 NOTE — DISCHARGE INSTRUCTIONS
Watch for any signs of infection, which would include increased redness pain or swelling.  You might noticed some puslike drainage coming out.  You also might notice some red streaks going up the thumb or the wrist.  If you notice anything like this you need to get in and get it checked out right away.
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
## 2018-10-19 ENCOUNTER — OFFICE VISIT (OUTPATIENT)
Dept: FAMILY MEDICINE | Facility: OTHER | Age: 33
End: 2018-10-19
Attending: NURSE PRACTITIONER
Payer: COMMERCIAL

## 2018-10-19 VITALS
HEIGHT: 70 IN | BODY MASS INDEX: 39.67 KG/M2 | SYSTOLIC BLOOD PRESSURE: 154 MMHG | WEIGHT: 277.13 LBS | DIASTOLIC BLOOD PRESSURE: 90 MMHG | TEMPERATURE: 99 F

## 2018-10-19 DIAGNOSIS — B18.2 CHRONIC HEPATITIS C WITHOUT HEPATIC COMA (H): ICD-10-CM

## 2018-10-19 DIAGNOSIS — M54.42 ACUTE BILATERAL LOW BACK PAIN WITH LEFT-SIDED SCIATICA: Primary | ICD-10-CM

## 2018-10-19 DIAGNOSIS — L03.012 PARONYCHIA OF FINGER OF LEFT HAND: ICD-10-CM

## 2018-10-19 DIAGNOSIS — B36.0 TINEA VERSICOLOR: ICD-10-CM

## 2018-10-19 LAB
ALBUMIN SERPL-MCNC: 4.8 G/DL (ref 3.5–5.7)
ALP SERPL-CCNC: 96 U/L (ref 34–104)
ALT SERPL W P-5'-P-CCNC: 35 U/L (ref 7–52)
ANION GAP SERPL CALCULATED.3IONS-SCNC: 9 MMOL/L (ref 3–14)
AST SERPL W P-5'-P-CCNC: 22 U/L (ref 13–39)
BILIRUB SERPL-MCNC: 0.3 MG/DL (ref 0.3–1)
BUN SERPL-MCNC: 16 MG/DL (ref 7–25)
CALCIUM SERPL-MCNC: 9.7 MG/DL (ref 8.6–10.3)
CHLORIDE SERPL-SCNC: 104 MMOL/L (ref 98–107)
CO2 SERPL-SCNC: 22 MMOL/L (ref 21–31)
CREAT SERPL-MCNC: 0.81 MG/DL (ref 0.7–1.3)
GFR SERPL CREATININE-BSD FRML MDRD: >90 ML/MIN/1.7M2
GLUCOSE SERPL-MCNC: 105 MG/DL (ref 70–105)
POTASSIUM SERPL-SCNC: 4 MMOL/L (ref 3.5–5.1)
PROT SERPL-MCNC: 7.3 G/DL (ref 6.4–8.9)
SODIUM SERPL-SCNC: 135 MMOL/L (ref 134–144)

## 2018-10-19 PROCEDURE — 87522 HEPATITIS C REVRS TRNSCRPJ: CPT | Performed by: NURSE PRACTITIONER

## 2018-10-19 PROCEDURE — 36415 COLL VENOUS BLD VENIPUNCTURE: CPT | Performed by: NURSE PRACTITIONER

## 2018-10-19 PROCEDURE — G0463 HOSPITAL OUTPT CLINIC VISIT: HCPCS

## 2018-10-19 PROCEDURE — 80053 COMPREHEN METABOLIC PANEL: CPT | Performed by: NURSE PRACTITIONER

## 2018-10-19 PROCEDURE — 99214 OFFICE O/P EST MOD 30 MIN: CPT | Performed by: NURSE PRACTITIONER

## 2018-10-19 RX ORDER — PREDNISONE 20 MG/1
20 TABLET ORAL DAILY
Qty: 5 TABLET | Refills: 0 | Status: SHIPPED | OUTPATIENT
Start: 2018-10-19 | End: 2019-01-18

## 2018-10-19 ASSESSMENT — ANXIETY QUESTIONNAIRES
5. BEING SO RESTLESS THAT IT IS HARD TO SIT STILL: NOT AT ALL
3. WORRYING TOO MUCH ABOUT DIFFERENT THINGS: NOT AT ALL
2. NOT BEING ABLE TO STOP OR CONTROL WORRYING: NOT AT ALL
GAD7 TOTAL SCORE: 0
6. BECOMING EASILY ANNOYED OR IRRITABLE: NOT AT ALL
1. FEELING NERVOUS, ANXIOUS, OR ON EDGE: NOT AT ALL
IF YOU CHECKED OFF ANY PROBLEMS ON THIS QUESTIONNAIRE, HOW DIFFICULT HAVE THESE PROBLEMS MADE IT FOR YOU TO DO YOUR WORK, TAKE CARE OF THINGS AT HOME, OR GET ALONG WITH OTHER PEOPLE: NOT DIFFICULT AT ALL
7. FEELING AFRAID AS IF SOMETHING AWFUL MIGHT HAPPEN: NOT AT ALL

## 2018-10-19 ASSESSMENT — PATIENT HEALTH QUESTIONNAIRE - PHQ9: 5. POOR APPETITE OR OVEREATING: NOT AT ALL

## 2018-10-19 ASSESSMENT — PAIN SCALES - GENERAL: PAINLEVEL: MILD PAIN (2)

## 2018-10-19 NOTE — MR AVS SNAPSHOT
After Visit Summary   10/19/2018    Jax Rojo    MRN: [id number]           Patient Information     Date Of Birth          1985        Visit Information        Provider Department      10/19/2018 2:45 PM Luci Mckeon CNP [facility] [place] [facility]        Today's Diagnoses     Acute bilateral low back pain with left-sided sciatica    -  1    Tinea versicolor        Paronychia of finger of left hand        Chronic hepatitis C without hepatic coma (H)          Care Instructions    ASSESSMENT/PLAN:     1. Acute bilateral low back pain with left-sided sciatica  Acute, symptomatic.  -Trial oral steroid to see if this is helpful  - Continue with conservative measures: Tylenol 1,000 mg four times daily, ibuprofen 800 mg every 8 hours, topical anesthetic such as Bengay or Biofreeze, heat and or ice, massage, can try chiropractor.         When You Have Low Back Pain    Caring for Your Back  You are not alone.    Low back pain is very common. Nearly half of all adults have low back pain in any given year. The good news is that back pain is rarely a danger to your health. Most people can manage their back pain on their own and about half of them start feeling better within 2 weeks. In 9 out of 10 cases, low back pain goes away or no longer limits daily activity within 6 weeks.     Your outlook is good!    Your symptoms tell us that your low back pain is most likely not a danger to you. Most of the time we do not know the exact cause of low back pain, even if you see a doctor or have an MRI. However, treatment can still work without knowing the cause of the pain. Less than 1 in 100 people need surgery for their back pain.     What can I do about my low back pain?     There are three things you can do to ease low back pain and help it go away.    Use heat or cold packs.    Take medicine as directed.    Use positions, movements and exercises.     Using heat or cold packs    Try cold packs or  gentle heat to ease your pain. Use whichever gives you the most relief. Apply the cold pack or heat for 15 minutes at a time, as often as needed.    Taking medicine      If your doctor has prescribed medicine, be sure to follow the directions.    If you take over-the-counter medicine, read and follow the directions.    Talk to your doctor if you have any questions.     Using positions, movements and exercises    Research tells us that moving your joints and muscles can help you recover from back pain. Such activity should be simple and gentle. Use the positions in the photos as well as walking to help relieve your pain. Try taking a short walk every 3 to 4 hours during the day. Walk for a few minutes inside your home or take longer walks outside, on a treadmill or at a mall. Slowly increase the amount of time you walk. Expect discomfort when you begin, but it should lessen as your back starts to heal. When your back feels better, walk daily to keep your back and body healthy.    Finding a comfortable position    When your back pain is new, certain positions will ease your pain. Gently try each of the positions below until you find one that is helpful. Once you find a position of comfort, use it as often as you like when you are resting. You will recover faster if you combine rest with activity.         Lie on your back with your legs bent. You can do this by placing a pillow under your knees. Or you may lie on the floor and rest your lower legs on the seat of a chair.       Lie on your side with your knees bent, and place a pillow between your knees.       Lie on your stomach over pillows.      When should I call my doctor?    Your back pain should improve over the first couple of weeks. As it improves, you should be able to return to your normal activities. But call your doctor if:    You have a sudden change in your ability to control your bladder or bowels.    You feel tingling in your groin or legs.    The pain  spreads down your leg and into your foot.    Your toes, feet or leg muscles feel weak.    You feel generally unwell or sick.    Your pain does not get better or gets worse.      If you are deaf or hard of hearing, please let us know. We provide many free services including sign language interpreters,oral interpreters, TTYs, telephone amplifiers, note takers and written materials.    For informational purposes only. Not to replace the advice of your health care provider. Copyright   2013 [facility]. All rights reserved. [employer] [id number] - Rev 06/14.    2. Tinea versicolor  Will check liver functioning and prescribe itraconazole  Again if normal since this was helpful in the past.    3. Paronychia of finger of left hand  Acute, symptomatic  - Can soak in warm water/epsom salt  - amoxicillin-clavulanate (AUGMENTIN) 875-125 MG per tablet; Take 1 tablet by mouth 2 times daily for 7 days  Dispense: 14 tablet; Refill: 0    - Take entire course of antibiotic even if you start to feel better.  - Antibiotics can cause stomach upset including nausea and diarrhea. Read your bottle or ask the pharmacist if antibiotic can be taken with food to help prevent nausea. If you have symptoms of diarrhea you can take an over-the-counter probiotic and/or increase foods with probiotics such as yogurt, Tom, sauerkraut.    -If no improvement or worsening may need and incision and drainage (I&D)      4. Chronic hepatitis C without hepatic coma (H)  Chronic  - Hepatitis C RNA, Quantitative; Future  - Comprehensive Metabolic Panel; Future        FUTURE APPOINTMENTS:       - Follow-up visit: Not improving or worsening symptoms we can consider imaging to further assess pain.    Luci Mckeon, CNP  [facility]            Follow-ups after your visit        Future tests that were ordered for you today     Open Future Orders        Priority Expected Expires Ordered    Hepatitis C RNA, Quantitative Routine   "10/19/2019 10/19/2018    Comprehensive Metabolic Panel Routine  10/19/2019 10/19/2018            Who to contact     If you have questions or need follow up information about today's clinic visit or your schedule please contact [facility] directly at [phone].  Normal or non-critical lab and imaging results will be communicated to you by MyChart, letter or phone within 4 business days after the clinic has received the results. If you do not hear from us within 7 days, please contact the clinic through [employer] or phone. If you have a critical or abnormal lab result, we will notify you by phone as soon as possible.  Submit refill requests through [employer] or call your pharmacy and they will forward the refill request to us. Please allow 3 business days for your refill to be completed.          Additional Information About Your Visit        MyChart Information     [employer] lets you send messages to your doctor, view your test results, renew your prescriptions, schedule appointments and more. To sign up, go to [email or web address] . Click on \"Log in\" on the left side of the screen, which will take you to the Welcome page. Then click on \"Sign up Now\" on the right side of the page.     You will be asked to enter the access code listed below, as well as some personal information. Please follow the directions to create your username and password.     Your access code is: [email or web address]  Expires: 2018  8:36 PM     Your access code will  in 90 days. If you need help or a new code, please call your [facility] clinic or [phone].        Care EveryWhere ID     This is your Care EveryWhere ID. This could be used by other organizations to access your [facility] medical records  [id number]        Your Vitals Were     Temperature Height BMI (Body Mass Index)             99  F (37.2  C) (Temporal) 5' 10\" (1.778 m) 39.76 kg/m2          Blood Pressure from Last 3 Encounters:   10/19/18 154/90 "   10/01/18 119/60   06/10/18 (!) 165/99    Weight from Last 3 Encounters:   10/19/18 277 lb 2 oz (125.7 kg)   10/01/18 279 lb 6.4 oz (126.7 kg)   12/07/17 272 lb 4 oz (123.5 kg)                 Today's Medication Changes          These changes are accurate as of 10/19/18  3:32 PM.  If you have any questions, ask your nurse or doctor.               Start taking these medicines.        Dose/Directions    amoxicillin-clavulanate 875-125 MG per tablet   Commonly known as:  AUGMENTIN   Used for:  Paronychia of finger of left hand   Started by:  Luci Mckeon CNP        Dose:  1 tablet   Take 1 tablet by mouth 2 times daily for 7 days   Quantity:  14 tablet   Refills:  0       predniSONE 20 MG tablet   Commonly known as:  DELTASONE   Used for:  Acute bilateral low back pain with left-sided sciatica   Started by:  Luci Mckeon CNP        Dose:  20 mg   Take 1 tablet (20 mg) by mouth daily   Quantity:  5 tablet   Refills:  0            Where to get your medicines      These medications were sent to [facility] [address] [place], [place] - [address]  [address], [facility] [address]     Phone:  [phone]     amoxicillin-clavulanate 875-125 MG per tablet    predniSONE 20 MG tablet                Primary Care Provider Fax #    Physician No Ref-Primary [phone]       No address on file        Equal Access to Services     DENG MELGOZA AH: Hadii armani cruzo Sogiovanni, waaxda luqadaha, qaybta kaalmada adeegyada, pascual simpson. So [facility] [phone].    ATENCIÓN: Si habla español, tiene a castle disposición servicios gratuitos de asistencia lingüística. Llame al [phone].    We comply with applicable federal civil rights laws and [place] laws. We do not discriminate on the basis of race, color, national origin, age, disability, sex, sexual orientation, or gender identity.            Thank you!     Thank you for choosing [facility]  for your care. Our goal is always to provide you with excellent care. Hearing back from our patients is one way we can continue to improve our services. Please take a few minutes to complete the written survey that you may receive in the mail after your visit with us. Thank you!             Your Updated Medication List - Protect others around you: Learn how to safely use, store and throw away your medicines at www.disposemymeds.org.          This list is accurate as of 10/19/18  3:32 PM.  Always use your most recent med list.                   Brand Name Dispense Instructions for use Diagnosis    amoxicillin-clavulanate 875-125 MG per tablet    AUGMENTIN    14 tablet    Take 1 tablet by mouth 2 times daily for 7 days    Paronychia of finger of left hand       EPINEPHrine 0.3 MG/0.3ML injection 2-pack    EPIPEN/ADRENACLICK/or ANY BX GENERIC EQUIV     Inject 0.3 mg into the muscle        lisinopril 10 MG tablet    PRINIVIL/ZESTRIL     Take 1 tablet by mouth daily        predniSONE 20 MG tablet    DELTASONE    5 tablet    Take 1 tablet (20 mg) by mouth daily    Acute bilateral low back pain with left-sided sciatica

## 2018-10-19 NOTE — NURSING NOTE
"Chief Complaint   Patient presents with     Kidney Problem         Initial /90  Temp 99  F (37.2  C) (Temporal)  Ht 5' 10\" (1.778 m)  Wt 277 lb 2 oz (125.7 kg)  BMI 39.76 kg/m2 Estimated body mass index is 39.76 kg/(m^2) as calculated from the following:    Height as of this encounter: 5' 10\" (1.778 m).    Weight as of this encounter: 277 lb 2 oz (125.7 kg).    Medication Reconciliation: complete      Norma J. Gosselin, LPN  "

## 2018-10-19 NOTE — PATIENT INSTRUCTIONS
ASSESSMENT/PLAN:     1. Acute bilateral low back pain with left-sided sciatica  Acute, symptomatic.  -Trial oral steroid to see if this is helpful  - Continue with conservative measures: Tylenol 1,000 mg four times daily, ibuprofen 800 mg every 8 hours, topical anesthetic such as Bengay or Biofreeze, heat and or ice, massage, can try chiropractor.         When You Have Low Back Pain    Caring for Your Back  You are not alone.    Low back pain is very common. Nearly half of all adults have low back pain in any given year. The good news is that back pain is rarely a danger to your health. Most people can manage their back pain on their own and about half of them start feeling better within 2 weeks. In 9 out of 10 cases, low back pain goes away or no longer limits daily activity within 6 weeks.     Your outlook is good!    Your symptoms tell us that your low back pain is most likely not a danger to you. Most of the time we do not know the exact cause of low back pain, even if you see a doctor or have an MRI. However, treatment can still work without knowing the cause of the pain. Less than 1 in 100 people need surgery for their back pain.     What can I do about my low back pain?     There are three things you can do to ease low back pain and help it go away.    Use heat or cold packs.    Take medicine as directed.    Use positions, movements and exercises.     Using heat or cold packs    Try cold packs or gentle heat to ease your pain. Use whichever gives you the most relief. Apply the cold pack or heat for 15 minutes at a time, as often as needed.    Taking medicine      If your doctor has prescribed medicine, be sure to follow the directions.    If you take over-the-counter medicine, read and follow the directions.    Talk to your doctor if you have any questions.     Using positions, movements and exercises    Research tells us that moving your joints and muscles can help you recover from back pain. Such activity  should be simple and gentle. Use the positions in the photos as well as walking to help relieve your pain. Try taking a short walk every 3 to 4 hours during the day. Walk for a few minutes inside your home or take longer walks outside, on a treadmill or at a mall. Slowly increase the amount of time you walk. Expect discomfort when you begin, but it should lessen as your back starts to heal. When your back feels better, walk daily to keep your back and body healthy.    Finding a comfortable position    When your back pain is new, certain positions will ease your pain. Gently try each of the positions below until you find one that is helpful. Once you find a position of comfort, use it as often as you like when you are resting. You will recover faster if you combine rest with activity.         Lie on your back with your legs bent. You can do this by placing a pillow under your knees. Or you may lie on the floor and rest your lower legs on the seat of a chair.       Lie on your side with your knees bent, and place a pillow between your knees.       Lie on your stomach over pillows.      When should I call my doctor?    Your back pain should improve over the first couple of weeks. As it improves, you should be able to return to your normal activities. But call your doctor if:    You have a sudden change in your ability to control your bladder or bowels.    You feel tingling in your groin or legs.    The pain spreads down your leg and into your foot.    Your toes, feet or leg muscles feel weak.    You feel generally unwell or sick.    Your pain does not get better or gets worse.      If you are deaf or hard of hearing, please let us know. We provide many free services including sign language interpreters,oral interpreters, TTYs, telephone amplifiers, note takers and written materials.    For informational purposes only. Not to replace the advice of your health care provider. Copyright   2013 [facility]. All  rights reserved. [employer] 933638 - Rev 06/14.    2. Tinea versicolor  Will check liver functioning and prescribe itraconazole  Again if normal since this was helpful in the past.    3. Paronychia of finger of left hand  Acute, symptomatic  - Can soak in warm water/epsom salt  - amoxicillin-clavulanate (AUGMENTIN) 875-125 MG per tablet; Take 1 tablet by mouth 2 times daily for 7 days  Dispense: 14 tablet; Refill: 0    - Take entire course of antibiotic even if you start to feel better.  - Antibiotics can cause stomach upset including nausea and diarrhea. Read your bottle or ask the pharmacist if antibiotic can be taken with food to help prevent nausea. If you have symptoms of diarrhea you can take an over-the-counter probiotic and/or increase foods with probiotics such as yogurt, [place], sauerkraut.    -If no improvement or worsening may need and incision and drainage (I&D)      4. Chronic hepatitis C without hepatic coma (H)  Chronic  - Hepatitis C RNA, Quantitative; Future  - Comprehensive Metabolic Panel; Future        FUTURE APPOINTMENTS:       - Follow-up visit: Not improving or worsening symptoms we can consider imaging to further assess pain.    Luci Mckeon, CNP  [facility]

## 2018-10-19 NOTE — PROGRESS NOTES
SUBJECTIVE:   Jax Rojo is a 33 year old male who presents to clinic today for the following health issues:    Hepatitis C  Patient has a history of IV drug use and tested positive for hepatitis C. He did harvoni for a short duration of time and then started doing alternative/natural treatments. Wife reports that his levels previously were showing no active hepatitis C but she is concerned with back pain that this could be his liver/hepatitis C.     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 no          Rash  On and off for years- reports it being tinea versicolor  Has tried soaps and lotions in the past without success.  Reports that oral medication has been the most helpful.     Finger  Pointer finger of left hand- injured the nail bed a few weeks ago  Continues to have some discomfort and deformity of the nailbed.           Problem list and histories reviewed & adjusted, as indicated.  Additional history: as documented    Patient  Active Problem List   Diagnosis     Chondromalacia of knee     Closed fracture of radius     History of drug abuse in remission     Essential hypertension     Obesity     Pityriasis versicolor     Chronic hepatitis C without hepatic coma (H)     ADHD (attention deficit hyperactivity disorder)     Anxiety state     Closed nondisplaced fracture of ulnar styloid with routine healing     Drug dependence (H)     Knee pain     Past Surgical History:   Procedure Laterality Date     ADENOIDECTOMY           ARTHROSCOPY KNEE      10/19/05,Right knee surgery, ACL reconstruction, MCL and meniscectomy,Dr. Bai     OTHER SURGICAL HISTORY      ,INCISION AND DRAINAGE,I & D chin abscess     TYMPANOSTOMY, LOCAL/TOPICAL ANESTHESIA      , 10/86, , ,PE tube placement     VASECTOMY             Social History   Substance Use Topics     Smoking status: Current Every Day Smoker     Packs/day: 0.25     Years: 2.00     Types: Cigarettes     Smokeless tobacco: Current User     Types: Chew, Snuff      Comment: Quit smokin Tin per week encouraged to quit     Alcohol use 0.0 oz/week      Comment: Alcoholic Drinks/day: Rare use     Family History   Problem Relation Age of Onset     Hypertension Father      Hypertension     HEART DISEASE Father      Heart Disease,CAD/MI     Breast Cancer Mother      Cancer-breast,Possible         Current Outpatient Prescriptions   Medication Sig Dispense Refill     amoxicillin-clavulanate (AUGMENTIN) 875-125 MG per tablet Take 1 tablet by mouth 2 times daily for 7 days 14 tablet 0     EPINEPHrine (EPIPEN/ADRENACLICK/OR ANY BX GENERIC EQUIV) 0.3 MG/0.3ML injection 2-pack Inject 0.3 mg into the muscle       itraconazole (ONMEL) 200 MG TABS tablet Take 1 tablet (200 mg) by mouth daily for 5 days 5 tablet 0     lisinopril (PRINIVIL/ZESTRIL) 10 MG tablet Take 1 tablet by mouth daily       predniSONE (DELTASONE) 20 MG tablet Take 1 tablet (20 mg) by mouth daily 5 tablet 0  "    Allergies   Allergen Reactions     Bee Anaphylaxis     Other reaction(s): Wheezing     Wasp Venom Protein Anaphylaxis     Carries an epi pen     Chlorthalidone      Other reaction(s): Other - Describe In Comment Field  Easy Sunburn when outside - works as .      Sulfamethoxazole W/Trimethoprim Hives and Rash     Sulfamethoxazole-Trimethoprim Rash     Recent Labs   Lab Test  10/19/18   1536  06/10/18   1950  02/05/16   1059   02/25/13   1015   LDL   --    --   119*   --   124   HDL   --    --   42   --   29*   TRIG   --    --   128   --   63   ALT  35  42   --    --    --    CR  0.81  0.97   --    < >  0.78   GFRESTIMATED  >90  89   --    --    --    GFRESTBLACK  >90  >90   --    < >  >60   POTASSIUM  4.0  3.7   --    < >  4.2    < > = values in this interval not displayed.      BP Readings from Last 3 Encounters:   10/19/18 154/90   10/01/18 119/60   06/10/18 (!) 165/99    Wt Readings from Last 3 Encounters:   10/19/18 277 lb 2 oz (125.7 kg)   10/01/18 279 lb 6.4 oz (126.7 kg)   12/07/17 272 lb 4 oz (123.5 kg)                    Reviewed and updated as needed this visit by clinical staff  Tobacco  Allergies  Meds  Med Hx  Surg Hx  Fam Hx  Soc Hx      Reviewed and updated as needed this visit by Provider         ROS:  Constitutional, HEENT, cardiovascular, pulmonary, gi and gu systems are negative, except as otherwise noted.    OBJECTIVE:     /90  Temp 99  F (37.2  C) (Temporal)  Ht 5' 10\" (1.778 m)  Wt 277 lb 2 oz (125.7 kg)  BMI 39.76 kg/m2  Body mass index is 39.76 kg/(m^2).     GENERAL: healthy, alert and no distress  EYES: Eyes grossly normal to inspection, PERRL and conjunctivae and sclerae normal  HENT: ear canals and TM's normal, nose and mouth without ulcers or lesions  NECK: no adenopathy, no asymmetry, masses, or scars  RESP: lungs clear to auscultation - no rales, rhonchi or wheezes  CV: regular rate and rhythm, normal S1 S2, no S3 or S4, no murmur, click or rub  ABDOMEN: soft, " nontender, no hepatosplenomegaly, no masses and bowel sounds normal  MS: no gross musculoskeletal defects noted, no edema, lumbar paraspinal tenderness, no vertebral tenderness, tenderness across bilateral lower back  SKIN: LEFT POINTER FINGER: Where nail bed was removed there is thin nail regrowth with fluctuant white pus underneath, cuticle intact, no erythema or active drainage, mild tenderness  NEURO: Normal strength and tone, mentation intact and speech normal  PSYCH: mentation appears normal, affect normal/bright    Diagnostic Test Results:  Results for orders placed or performed in visit on 10/19/18   Comprehensive Metabolic Panel   Result Value Ref Range    Sodium 135 134 - 144 mmol/L    Potassium 4.0 3.5 - 5.1 mmol/L    Chloride 104 98 - 107 mmol/L    Carbon Dioxide 22 21 - 31 mmol/L    Anion Gap 9 3 - 14 mmol/L    Glucose 105 70 - 105 mg/dL    Urea Nitrogen 16 7 - 25 mg/dL    Creatinine 0.81 0.70 - 1.30 mg/dL    GFR Estimate >90 >60 mL/min/1.7m2    GFR Estimate If Black >90 >60 mL/min/1.7m2    Calcium 9.7 8.6 - 10.3 mg/dL    Bilirubin Total 0.3 0.3 - 1.0 mg/dL    Albumin 4.8 3.5 - 5.7 g/dL    Protein Total 7.3 6.4 - 8.9 g/dL    Alkaline Phosphatase 96 34 - 104 U/L    ALT 35 7 - 52 U/L    AST 22 13 - 39 U/L         ASSESSMENT/PLAN:     1. Acute bilateral low back pain with left-sided sciatica  Acute, symptomatic.  -Trial oral steroid to see if this is helpful  - Continue with conservative measures: Tylenol 1,000 mg four times daily, ibuprofen 800 mg every 8 hours, topical anesthetic such as Bengay or Biofreeze, heat and or ice, massage, can try chiropractor.             When You Have Low Back Pain    Caring for Your Back  You are not alone.    Low back pain is very common. Nearly half of all adults have low back pain in any given year. The good news is that back pain is rarely a danger to your health. Most people can manage their back pain on their own and about half of them start feeling better within 2  weeks. In 9 out of 10 cases, low back pain goes away or no longer limits daily activity within 6 weeks.     Your outlook is good!    Your symptoms tell us that your low back pain is most likely not a danger to you. Most of the time we do not know the exact cause of low back pain, even if you see a doctor or have an MRI. However, treatment can still work without knowing the cause of the pain. Less than 1 in 100 people need surgery for their back pain.     What can I do about my low back pain?     There are three things you can do to ease low back pain and help it go away.    Use heat or cold packs.    Take medicine as directed.    Use positions, movements and exercises.     Using heat or cold packs    Try cold packs or gentle heat to ease your pain. Use whichever gives you the most relief. Apply the cold pack or heat for 15 minutes at a time, as often as needed.    Taking medicine      If your doctor has prescribed medicine, be sure to follow the directions.    If you take over-the-counter medicine, read and follow the directions.    Talk to your doctor if you have any questions.     Using positions, movements and exercises    Research tells us that moving your joints and muscles can help you recover from back pain. Such activity should be simple and gentle. Use the positions in the photos as well as walking to help relieve your pain. Try taking a short walk every 3 to 4 hours during the day. Walk for a few minutes inside your home or take longer walks outside, on a treadmill or at a mall. Slowly increase the amount of time you walk. Expect discomfort when you begin, but it should lessen as your back starts to heal. When your back feels better, walk daily to keep your back and body healthy.    Finding a comfortable position    When your back pain is new, certain positions will ease your pain. Gently try each of the positions below until you find one that is helpful. Once you find a position of comfort, use it as  often as you like when you are resting. You will recover faster if you combine rest with activity.         Lie on your back with your legs bent. You can do this by placing a pillow under your knees. Or you may lie on the floor and rest your lower legs on the seat of a chair.       Lie on your side with your knees bent, and place a pillow between your knees.       Lie on your stomach over pillows.      When should I call my doctor?    Your back pain should improve over the first couple of weeks. As it improves, you should be able to return to your normal activities. But call your doctor if:    You have a sudden change in your ability to control your bladder or bowels.    You feel tingling in your groin or legs.    The pain spreads down your leg and into your foot.    Your toes, feet or leg muscles feel weak.    You feel generally unwell or sick.    Your pain does not get better or gets worse.      If you are deaf or hard of hearing, please let us know. We provide many free services including sign language interpreters,oral interpreters, TTYs, telephone amplifiers, note takers and written materials.    For informational purposes only. Not to replace the advice of your health care provider. Copyright   2013 [facility]. All rights reserved. [employer] 054504 - Rev 06/14.    2. Tinea versicolor  Will check liver functioning and prescribe itraconazole  Again if normal since this was helpful in the past.    3. Paronychia of finger of left hand  Acute, symptomatic  - Can soak in warm water/epsom salt  - amoxicillin-clavulanate (AUGMENTIN) 875-125 MG per tablet; Take 1 tablet by mouth 2 times daily for 7 days  Dispense: 14 tablet; Refill: 0    - Take entire course of antibiotic even if you start to feel better.  - Antibiotics can cause stomach upset including nausea and diarrhea. Read your bottle or ask the pharmacist if antibiotic can be taken with food to help prevent nausea. If you have symptoms of diarrhea  you can take an over-the-counter probiotic and/or increase foods with probiotics such as yogurt, Tom, sauerkraut.    -If no improvement or worsening may need and incision and drainage (I&D)      4. Chronic hepatitis C without hepatic coma (H)  Chronic  - Hepatitis C RNA, Quantitative; Future  - Comprehensive Metabolic Panel; Future        FUTURE APPOINTMENTS:       - Follow-up visit: Not improving or worsening symptoms we can consider imaging to further assess pain.    Luci Mckeon CNP  [facility]

## 2018-10-20 ASSESSMENT — ANXIETY QUESTIONNAIRES: GAD7 TOTAL SCORE: 0

## 2018-10-24 ENCOUNTER — TELEPHONE (OUTPATIENT)
Dept: FAMILY MEDICINE | Facility: OTHER | Age: 33
End: 2018-10-24

## 2018-10-24 DIAGNOSIS — B36.0 TINEA VERSICOLOR: Primary | ICD-10-CM

## 2018-10-24 NOTE — TELEPHONE ENCOUNTER
Kimberly from [facility] called regarding the Rx Onmel. She said that this drug has been discontinued in the [place]. She said that the preferred formulary options are topicals such as Lamisil and Nystatin. She also said that an oral Rx could be Fluconazole.   Shital Oglesby on 10/24/2018 at 1:58 PM

## 2018-10-25 LAB
HCV RNA SERPL NAA+PROBE-ACNC: ABNORMAL [IU]/ML
HCV RNA SERPL NAA+PROBE-LOG IU: 6.7 LOG IU/ML

## 2018-10-29 RX ORDER — FLUCONAZOLE 150 MG/1
300 TABLET ORAL WEEKLY
Qty: 4 TABLET | Refills: 0 | Status: SHIPPED | OUTPATIENT
Start: 2018-10-29 | End: 2018-11-06

## 2018-10-29 NOTE — TELEPHONE ENCOUNTER
Called [facility] and confirmed the prescription was sent over and filled.    Left message to call back clinic....................  10/29/2018   11:53 AM  Beth Bhakta LPN on 10/29/2018 at 11:53 AM

## 2018-10-30 NOTE — TELEPHONE ENCOUNTER
Called patient and informed him of new prescription.        Beth Bhakta LPN on 10/30/2018 at 11:56 AM

## 2019-01-18 ENCOUNTER — OFFICE VISIT (OUTPATIENT)
Dept: FAMILY MEDICINE | Facility: OTHER | Age: 34
End: 2019-01-18
Attending: NURSE PRACTITIONER
Payer: COMMERCIAL

## 2019-01-18 VITALS
OXYGEN SATURATION: 97 % | DIASTOLIC BLOOD PRESSURE: 80 MMHG | HEART RATE: 90 BPM | WEIGHT: 287.6 LBS | BODY MASS INDEX: 40.26 KG/M2 | TEMPERATURE: 98.4 F | HEIGHT: 71 IN | SYSTOLIC BLOOD PRESSURE: 140 MMHG

## 2019-01-18 DIAGNOSIS — Z20.2 EXPOSURE TO STD: Primary | ICD-10-CM

## 2019-01-18 LAB
C TRACH DNA SPEC QL PROBE+SIG AMP: NOT DETECTED
N GONORRHOEA DNA SPEC QL PROBE+SIG AMP: NOT DETECTED
SPECIMEN SOURCE: NORMAL

## 2019-01-18 PROCEDURE — G0463 HOSPITAL OUTPT CLINIC VISIT: HCPCS

## 2019-01-18 PROCEDURE — 87591 N.GONORRHOEAE DNA AMP PROB: CPT | Performed by: NURSE PRACTITIONER

## 2019-01-18 PROCEDURE — 99213 OFFICE O/P EST LOW 20 MIN: CPT | Performed by: NURSE PRACTITIONER

## 2019-01-18 PROCEDURE — 87491 CHLMYD TRACH DNA AMP PROBE: CPT | Performed by: NURSE PRACTITIONER

## 2019-01-18 ASSESSMENT — MIFFLIN-ST. JEOR: SCORE: 2278.93

## 2019-01-18 ASSESSMENT — PAIN SCALES - GENERAL: PAINLEVEL: NO PAIN (0)

## 2019-01-18 NOTE — NURSING NOTE
Chief Complaint   Patient presents with     Exposure to STD       Medication Reconciliation: complete   Patient here for std testing. Patient is not having any symptoms. Sanjana Shaffer LPN .............1/18/2019  5:42 PM

## 2019-01-18 NOTE — PROGRESS NOTES
"Nursing Notes:   Sanjana Shaffer LPN  1/18/2019  5:49 PM  Sign at exiting of workspace  Chief Complaint   Patient presents with     Exposure to STD       Medication Reconciliation: complete   Patient here for std testing. Patient is not having any symptoms. Sanjana Shaffer LPN .............1/18/2019  5:42 PM          SUBJECTIVE:   Jax Rojo is a 33 year old male who presents to clinic today for the following health issues:    He is here with his SO. She wants HSV testing for them both. He feels he has no s/s. No fever, chills, no new lesions, no discharge or concerns. He is here b/c she wants him here. He is not worried about anything.       Problem list and histories reviewed & adjusted, as indicated.  Additional history: as documented    Current Outpatient Medications   Medication Sig Dispense Refill     lisinopril (PRINIVIL/ZESTRIL) 10 MG tablet Take 1 tablet by mouth daily       EPINEPHrine (EPIPEN/ADRENACLICK/OR ANY BX GENERIC EQUIV) 0.3 MG/0.3ML injection 2-pack Inject 0.3 mg into the muscle       Allergies   Allergen Reactions     Bee Anaphylaxis     Other reaction(s): Wheezing     Wasp Venom Protein Anaphylaxis     Carries an epi pen     Chlorthalidone      Other reaction(s): Other - Describe In Comment Field  Easy Sunburn when outside - works as .      Sulfamethoxazole W/Trimethoprim Hives and Rash     Sulfamethoxazole-Trimethoprim Rash         ROS:  Notable findings in the HPI.       OBJECTIVE:     /80 (BP Location: Right arm, Patient Position: Sitting, Cuff Size: Adult Large)   Pulse 90   Temp 98.4  F (36.9  C) (Tympanic)   Ht 1.815 m (5' 11.46\")   Wt 130.5 kg (287 lb 9.6 oz)   SpO2 97%   BMI 39.60 kg/m    Body mass index is 39.6 kg/m .  GENERAL: alert and no distress  EYES: Eyes grossly normal to inspection  HENT: normal cephalic/atraumatic and oral mucous membranes moist  RESP: Without increased work of breathing.   SKIN: no suspicious lesions or rashes  PSYCH: " mentation appears normal, affect normal/bright    Diagnostic Test Results:  In process.     ASSESSMENT/PLAN:     1. Exposure to STD  - GC/Chlamydia by PCR - [facility]    PLAN:    Will call with lab results. F/u if needed. Discussed HSV and not doing blood work for it. F/U PRN.     Followup:    If not improving or if condition worsens, follow up with your Primary Care Provider    I explained my diagnostic considerations and recommendations to the patient, who voiced understanding and agreement with the treatment plan. All questions were answered. We discussed potential side effects of any prescribed or recommended therapies, as well as expectations for response to treatments. He was advised to contact our office if there is no improvement or worsening of conditions or symptoms.  If s/s worsen or persist, patient will either come back or follow up with PCP.    Disclaimer:  This note consists of words and symbols derived from keyboarding, dictation, or using voice recognition software. As a result, there may be errors in the script that have gone undetected. Please consider this when interpreting information found in this note.      Laura Jackman NP, 1/18/2019 5:50 PM

## 2019-08-11 ENCOUNTER — OFFICE VISIT (OUTPATIENT)
Dept: FAMILY MEDICINE | Facility: OTHER | Age: 34
End: 2019-08-11
Attending: PHYSICIAN ASSISTANT
Payer: COMMERCIAL

## 2019-08-11 VITALS
WEIGHT: 259.2 LBS | DIASTOLIC BLOOD PRESSURE: 92 MMHG | HEIGHT: 70 IN | RESPIRATION RATE: 20 BRPM | BODY MASS INDEX: 37.11 KG/M2 | SYSTOLIC BLOOD PRESSURE: 164 MMHG | HEART RATE: 88 BPM | TEMPERATURE: 98.5 F

## 2019-08-11 DIAGNOSIS — I10 ESSENTIAL HYPERTENSION: ICD-10-CM

## 2019-08-11 DIAGNOSIS — B36.0 TINEA VERSICOLOR: Primary | ICD-10-CM

## 2019-08-11 DIAGNOSIS — L98.9 LESION OF FACE: ICD-10-CM

## 2019-08-11 PROCEDURE — G0463 HOSPITAL OUTPT CLINIC VISIT: HCPCS

## 2019-08-11 PROCEDURE — 99214 OFFICE O/P EST MOD 30 MIN: CPT | Performed by: PHYSICIAN ASSISTANT

## 2019-08-11 RX ORDER — FLUCONAZOLE 150 MG/1
TABLET ORAL
Qty: 4 TABLET | Refills: 0 | Status: SHIPPED | OUTPATIENT
Start: 2019-08-11 | End: 2020-03-23

## 2019-08-11 RX ORDER — KETOCONAZOLE 20 MG/ML
SHAMPOO TOPICAL
Qty: 100 ML | Refills: 0 | Status: SHIPPED | OUTPATIENT
Start: 2019-08-11 | End: 2021-10-20

## 2019-08-11 RX ORDER — MUPIROCIN 20 MG/G
OINTMENT TOPICAL 2 TIMES DAILY
Qty: 15 G | Refills: 0 | Status: SHIPPED | OUTPATIENT
Start: 2019-08-11 | End: 2020-03-23

## 2019-08-11 RX ORDER — LISINOPRIL 10 MG/1
10 TABLET ORAL DAILY
Qty: 30 TABLET | Refills: 0 | Status: SHIPPED | OUTPATIENT
Start: 2019-08-11 | End: 2019-10-29

## 2019-08-11 ASSESSMENT — MIFFLIN-ST. JEOR: SCORE: 2121.97

## 2019-08-11 NOTE — PROGRESS NOTES
SUBJECTIVE:   Jax Rojo is a 34 year old male presenting with a chief complaint of   Chief Complaint   Patient presents with     Derm Problem     Nursing Notes:   Gloria Treviño CMA  8/11/2019  2:34 PM  Signed  Patient presents to the clinic for spots on torso and arms that started a months ago. Patient reports having tinea versicolor before.   Medication Reconciliation: complete    Gloria Treviño CMA      HPI:  Jax presents to [facility] accompanied by his wife with complaints of rash on the arms and trunk.  He states he has had this off and on over the years and has a previous diagnosis of tinea versicolor.  He recalls taking oral ketoconazole,  that being very helpful.  He feels like his treatment since then have not been as helpful.  He describes using some shampoo, does not recall the name.  He also states that he has used creams and taken 1 dose pill in the past with only partial resolution of the rash.  He does not find it to be very itchy.  It is not painful at all.  He notices that it worsens when he is out in hot weather or when he is sweating more.  He has otherwise been feeling fine.  Denies any fever, chills, nausea, vomiting, cough or cold symptoms.    He is asking for refill of his blood pressure medication today.  He has been out of it for 1 week.  He is not having any headaches, vision changes, chest pain, or shortness of breath.  He ran out of the medication because it is difficult for him to get in to see his regular provider.    He had an ingrown hair on the right jawline in the beard.  This is been persisting for about 3 months as just a nodule, not inflamed.  However, in the last week, he has scratched off the top, and it has become erythematous and is draining.    He has a history of using cream or different shampoos over the years.    He has h/o tinea versicolor, seems to get worse in the winter.      Review of Systems  See HPI.    Past Medical History:   Diagnosis Date      Chondromalacia of knee     2014     Encounter for other administrative examinations     3/5/2013     Essential (primary) hypertension     3/5/2013     Muscle spasm of back     3/5/2013     Obesity     No Comments Provided     Other psychoactive substance abuse, uncomplicated (H)     2007     Personal history of other medical treatment (CODE)     No Comments Provided     Pityriasis versicolor     3/8/2012     Family History   Problem Relation Age of Onset     Hypertension Father         Hypertension     Heart Disease Father         Heart Disease,CAD/MI     Breast Cancer Mother         Cancer-breast,Possible     Current Outpatient Medications   Medication Sig Dispense Refill        0        0     lisinopril (PRINIVIL/ZESTRIL) 10 MG tablet Take 1 tablet (10 mg) by mouth daily 30 tablet 0             EPINEPHrine (EPIPEN/ADRENACLICK/OR ANY BX GENERIC EQUIV) 0.3 MG/0.3ML injection 2-pack Inject 0.3 mg into the muscle       lisinopril (PRINIVIL/ZESTRIL) 10 MG tablet Take 1 tablet by mouth daily          Allergies   Allergen Reactions     Bee Anaphylaxis     Other reaction(s): Wheezing     Wasp Venom Protein Anaphylaxis     Carries an epi pen     Chlorthalidone      Other reaction(s): Other - Describe In Comment Field  Easy Sunburn when outside - works as .      Sulfamethoxazole W/Trimethoprim Hives and Rash     Sulfamethoxazole-Trimethoprim Rash         Social History     Tobacco Use     Smoking status: Current Every Day Smoker     Packs/day: 0.25     Years: 2.00     Pack years: 0.50     Types: Cigarettes     Smokeless tobacco: Current User     Types: Chew, Snuff     Tobacco comment: Quit smokin Tin per week encouraged to quit   Substance Use Topics     Alcohol use: Yes     Alcohol/week: 0.0 oz     Comment: Alcoholic Drinks/day: Rare use       OBJECTIVE  BP (!) 164/92 (BP Location: Left arm, Patient Position: Sitting, Cuff Size: Adult Regular)   Pulse 88   Temp 98.5  F (36.9  C) (Tympanic)   Resp 20    "Ht 1.778 m (5' 10\")   Wt 117.6 kg (259 lb 3.2 oz)   BMI 37.19 kg/m      Physical Exam   Constitutional: He appears well-developed and well-nourished. No distress.   Cardiovascular: Normal rate, regular rhythm and normal heart sounds.   No murmur heard.  Pulmonary/Chest: Effort normal and breath sounds normal. No respiratory distress.   Skin: Skin is warm and dry.   Hyperpigmented macular rash on proximal extremities and anterior trunk.  Right jawline has excoriated crusted skin lesion, acutely inflamed.       Labs:  No results found for this or any previous visit (from the past 24 hour(s)).        ASSESSMENT:      ICD-10-CM    1. Essential hypertension I10 lisinopril (PRINIVIL/ZESTRIL) 10 MG tablet   2. Tinea versicolor B36.0 ketoconazole (NIZORAL) 2 % external shampoo     fluconazole (DIFLUCAN) 150 MG tablet   3. Lesion of face L98.9 mupirocin (BACTROBAN) 2 % external ointment        PLAN:    Prescribed oral fluconazole to take 2 pills today and repeat in 1 week.  Advised patient that oral ketoconazole is no longer recommended secondary to potential for toxicity.  He is understanding.  He may, however, use ketoconazole shampoo and this may be effective in 1 dose.  He may wish to use this weekly for prevention, especially during the hot summer months.  Agreed to fill a one-month supply of his lisinopril.  However, he will need to follow-up with his primary care provider for lab, blood pressure monitoring and surveillance.  He understands and will be able to coordinate an appointment within the next month.  For the lesion of the face, will use some Bactroban ointment twice daily and he can check this with his primary care provider at his upcoming appointment.  He and his wife understand and agree with this plan. Geraldine Bernal PA-C on 8/11/2019 at 3:04 PM            Patient Instructions   Patient Education   Patient Education     Established High Blood Pressure    High blood pressure (hypertension) is a chronic " disease. Often, healthcare providers don t know what causes it. But it can be caused by certain health conditions and medicines.  If you have high blood pressure, you may not have any symptoms. If you do have symptoms, they may include headache, dizziness, changes in your vision, chest pain, and shortness of breath. But even without symptoms, high blood pressure that s not treated raises your risk for heart attack, heart failure, and stroke. High blood pressure is a serious health risk and shouldn t be ignored.  Blood pressure measurements are given as 2 numbers. Systolic blood pressure is the upper number. This is the pressure when the heart contracts. Diastolic blood pressure is the lower number. This is the pressure when the heart relaxes between beats. You will see your blood pressure readings written together. For example, a person with a systolic pressure of 118 and a diastolic pressure of 78 will have 118/78 written in the medical record.  Blood pressure is categorized as normal, elevated, or stage 1 or stage 2 high blood pressure:    Normal blood pressure is systolic of less than 120 and diastolic of less than 80 (120/80)    Elevated blood pressure is systolic of 120 to 129 and diastolic less than 80    Stage 1 high blood pressure is systolic is 130 to 139 or diastolic between 80 to 89    Stage 2 high blood pressure is when systolic is 140 or higher or the diastolic is 90 or higher  Home care  If you have high blood pressure, follow these home care guidelines to help lower your blood pressure. If you are taking medicines for high blood pressure, these methods may reduce or end your need for medicines in the future.    Start a weight-loss program if you are overweight.    Cut back on how much salt you get in your diet. Here s how to do this:  ? Don t eat foods that have a lot of salt. These include olives, pickles, smoked meats, and salted potato chips.  ? Don t add salt to your food at the table.  ? Use only  small amounts of salt when cooking.    Start an exercise program. Talk with your healthcare provider about the type of exercise program that would be best for you. It doesn't have to be hard. Even brisk walking for 20 minutes 3 times a week is a good form of exercise.    Don t take medicines that stimulate the heart. This includes many over-the-counter cold and sinus decongestant pills and sprays, as well as diet pills. Check the warnings about high blood pressure on the label. Before buying any over-the-counter medicines or supplements, always ask the pharmacist about the product's potential interaction with your high blood pressure and your high blood pressure medicines.    Stimulants such as amphetamine or cocaine could be deadly for someone with high blood pressure. Never take these.    Limit how much caffeine you get in your diet. Switch to caffeine-free products.    Stop smoking. If you are a long-time smoker, this can be hard. Talk to your healthcare provider about medicines and nicotine replacement options to help you. Also, enroll in a stop-smoking program to make it more likely that you will quit for good.    Learn how to handle stress. This is an important part of any program to lower blood pressure. Learn about relaxation methods like meditation, yoga, or biofeedback.    If your provider prescribed medicines, take them exactly as directed. Missing doses may cause your blood pressure get out of control.    If you miss a dose or doses, check with your healthcare provider or pharmacist about what to do.    Consider buying an automatic blood pressure machine to check your blood pressure at home. Ask your provider for a recommendation. You can get one of these at most pharmacies.     The American Heart Association recommends the following guidelines for home blood pressure monitoring:    Don't smoke or drink coffee for 30 minutes before taking your blood pressure.    Go to the bathroom before the  test.    Relax for 5 minutes before taking the measurement.    Sit with your back supported (don't sit on a couch or soft chair); keep your feet on the floor uncrossed. Place your arm on a solid flat surface (like a table) with the upper part of the arm at heart level. Place the middle of the cuff directly above the bend of the elbow. Check the monitor's instruction manual for an illustration.    Take multiple readings. When you measure, take 2 to 3 readings one minute apart and record all of the results.    Take your blood pressure at the same time every day, or as your healthcare provider recommends.    Record the date, time, and blood pressure reading.    Take the record with you to your next medical appointment. If your blood pressure monitor has a built-in memory, simply take the monitor with you to your next appointment.    Call your provider if you have several high readings. Don't be frightened by a single high blood pressure reading, but if you get several high readings, check in with your healthcare provider.    Note: When blood pressure reaches a systolic (top number) of 180 or higher OR diastolic (bottom number) of 110 or higher, seek emergency medical treatment.  Follow-up care  You will need to see your healthcare provider regularly. This is to check your blood pressure and to make changes to your medicines. Make a follow-up appointment as directed. Bring the record of your home blood pressure readings to the appointment.  When to seek medical advice  Call your healthcare provider right away if any of these occur:    Blood pressure reaches a systolic (upper number) of 180 or higher OR a diastolic (bottom number) of 110 or higher    Chest pain or shortness of breath    Severe headache    Throbbing or rushing sound in the ears    Nosebleed    Sudden severe pain in your belly (abdomen)    Extreme drowsiness, confusion, or fainting    Dizziness or spinning sensation (vertigo)    Weakness of an arm or leg  or one side of the face    You have problems speaking or seeing   Date Last Reviewed: 12/1/2016 2000-2018 [employer]. [address] [place] [address], [place] [address]. All rights reserved. This information is not intended as a substitute for professional medical care. Always follow your healthcare professional's instructions.           Tinea Versicolor  This is a rash caused by a fungus in the top layers of the skin. This fungus is normally present in the pores of the skin and causes no symptoms. But when the fungus overgrows it causes a rash. The fungus grows more easily in hot climates, and on oily or sweaty skin. Health experts don t know why some people get this rash and others don t. Experts also don t know why the rash will suddenly appear in someone who has never had it before.  The rash is made up of irregular pale or tan spots and patches. The rash is usually on the neck, upper back, chest, and shoulders. You may have mild itching, especially if you become overheated. But it doesn't cause other symptoms. Because these spots don't change color with sun exposure like normal skin, the rash may be lighter or darker than your normal skin.  This rash is harmless and usually causes no symptoms. The only reason for treatment is to improve appearance. Follow the advice below to clear the rash. It might take several months for normal skin color to return.  Home care    Use a medicated dandruff shampoo over your whole body while in the shower. Don t use soap. Let the shampoo stay on for at least a few minutes before rinsing off. Do this every day for 4 weeks.    As a different treatment, you may buy an antifungal cream (miconazole or clotrimazole, both available without a prescription). Use this 2 times a day for 7 days.     This rash is not contagious to others. It can t be spread if someone touches it. So you don t have to worry about exposing others at school, , or work.  Prevention  This fungus  can come back again (recur) after treatment. To prevent return of the rash, use medicated dandruff shampoo over your whole body when in the shower. Do this once a month for the next year. This is very important to do in the summertime. That is when the rash is most likely to recur.  Other prevention tips include:    Avoid oily skin products    Wear loose clothing. Try to let your skin stay cool and breathe.    Use sunscreen and protect yourself from sunlight    Avoid tanning beds  Follow-up care  Follow up with your healthcare provider, or as advised. Call your provider if the rash doesn t get better with the above treatment, or if new symptoms appear.  When to seek medical advice  Call your healthcare provider right away if any of these occur:    Increasing redness of the rash    Change in appearance of the rash    Fever of 100.4 F (38 C) or higher, or as directed by your provider  Date Last Reviewed: 8/1/2016 2000-2018 [employer]. [address] [place] [address], [place] [address]. All rights reserved. This information is not intended as a substitute for professional medical care. Always follow your healthcare professional's instructions.

## 2019-08-11 NOTE — NURSING NOTE
Patient presents to the clinic for spots on torso and arms that started a months ago. Patient reports having tinea versicolor before.   Medication Reconciliation: complete    Gloria Treviño, CMA

## 2019-08-11 NOTE — PATIENT INSTRUCTIONS
Patient Education   Patient Education     Established High Blood Pressure    High blood pressure (hypertension) is a chronic disease. Often, healthcare providers don t know what causes it. But it can be caused by certain health conditions and medicines.  If you have high blood pressure, you may not have any symptoms. If you do have symptoms, they may include headache, dizziness, changes in your vision, chest pain, and shortness of breath. But even without symptoms, high blood pressure that s not treated raises your risk for heart attack, heart failure, and stroke. High blood pressure is a serious health risk and shouldn t be ignored.  Blood pressure measurements are given as 2 numbers. Systolic blood pressure is the upper number. This is the pressure when the heart contracts. Diastolic blood pressure is the lower number. This is the pressure when the heart relaxes between beats. You will see your blood pressure readings written together. For example, a person with a systolic pressure of 118 and a diastolic pressure of 78 will have 118/78 written in the medical record.  Blood pressure is categorized as normal, elevated, or stage 1 or stage 2 high blood pressure:    Normal blood pressure is systolic of less than 120 and diastolic of less than 80 (120/80)    Elevated blood pressure is systolic of 120 to 129 and diastolic less than 80    Stage 1 high blood pressure is systolic is 130 to 139 or diastolic between 80 to 89    Stage 2 high blood pressure is when systolic is 140 or higher or the diastolic is 90 or higher  Home care  If you have high blood pressure, follow these home care guidelines to help lower your blood pressure. If you are taking medicines for high blood pressure, these methods may reduce or end your need for medicines in the future.    Start a weight-loss program if you are overweight.    Cut back on how much salt you get in your diet. Here s how to do this:  ? Don t eat foods that have a lot of salt.  These include olives, pickles, smoked meats, and salted potato chips.  ? Don t add salt to your food at the table.  ? Use only small amounts of salt when cooking.    Start an exercise program. Talk with your healthcare provider about the type of exercise program that would be best for you. It doesn't have to be hard. Even brisk walking for 20 minutes 3 times a week is a good form of exercise.    Don t take medicines that stimulate the heart. This includes many over-the-counter cold and sinus decongestant pills and sprays, as well as diet pills. Check the warnings about high blood pressure on the label. Before buying any over-the-counter medicines or supplements, always ask the pharmacist about the product's potential interaction with your high blood pressure and your high blood pressure medicines.    Stimulants such as amphetamine or cocaine could be deadly for someone with high blood pressure. Never take these.    Limit how much caffeine you get in your diet. Switch to caffeine-free products.    Stop smoking. If you are a long-time smoker, this can be hard. Talk to your healthcare provider about medicines and nicotine replacement options to help you. Also, enroll in a stop-smoking program to make it more likely that you will quit for good.    Learn how to handle stress. This is an important part of any program to lower blood pressure. Learn about relaxation methods like meditation, yoga, or biofeedback.    If your provider prescribed medicines, take them exactly as directed. Missing doses may cause your blood pressure get out of control.    If you miss a dose or doses, check with your healthcare provider or pharmacist about what to do.    Consider buying an automatic blood pressure machine to check your blood pressure at home. Ask your provider for a recommendation. You can get one of these at most pharmacies.     The American Heart Association recommends the following guidelines for home blood pressure  monitoring:    Don't smoke or drink coffee for 30 minutes before taking your blood pressure.    Go to the bathroom before the test.    Relax for 5 minutes before taking the measurement.    Sit with your back supported (don't sit on a couch or soft chair); keep your feet on the floor uncrossed. Place your arm on a solid flat surface (like a table) with the upper part of the arm at heart level. Place the middle of the cuff directly above the bend of the elbow. Check the monitor's instruction manual for an illustration.    Take multiple readings. When you measure, take 2 to 3 readings one minute apart and record all of the results.    Take your blood pressure at the same time every day, or as your healthcare provider recommends.    Record the date, time, and blood pressure reading.    Take the record with you to your next medical appointment. If your blood pressure monitor has a built-in memory, simply take the monitor with you to your next appointment.    Call your provider if you have several high readings. Don't be frightened by a single high blood pressure reading, but if you get several high readings, check in with your healthcare provider.    Note: When blood pressure reaches a systolic (top number) of 180 or higher OR diastolic (bottom number) of 110 or higher, seek emergency medical treatment.  Follow-up care  You will need to see your healthcare provider regularly. This is to check your blood pressure and to make changes to your medicines. Make a follow-up appointment as directed. Bring the record of your home blood pressure readings to the appointment.  When to seek medical advice  Call your healthcare provider right away if any of these occur:    Blood pressure reaches a systolic (upper number) of 180 or higher OR a diastolic (bottom number) of 110 or higher    Chest pain or shortness of breath    Severe headache    Throbbing or rushing sound in the ears    Nosebleed    Sudden severe pain in your belly  (abdomen)    Extreme drowsiness, confusion, or fainting    Dizziness or spinning sensation (vertigo)    Weakness of an arm or leg or one side of the face    You have problems speaking or seeing   Date Last Reviewed: 12/1/2016 2000-2018 [employer]. [address] [place] [address]. All rights reserved. This information is not intended as a substitute for professional medical care. Always follow your healthcare professional's instructions.           Tinea Versicolor  This is a rash caused by a fungus in the top layers of the skin. This fungus is normally present in the pores of the skin and causes no symptoms. But when the fungus overgrows it causes a rash. The fungus grows more easily in hot climates, and on oily or sweaty skin. Health experts don t know why some people get this rash and others don t. Experts also don t know why the rash will suddenly appear in someone who has never had it before.  The rash is made up of irregular pale or tan spots and patches. The rash is usually on the neck, upper back, chest, and shoulders. You may have mild itching, especially if you become overheated. But it doesn't cause other symptoms. Because these spots don't change color with sun exposure like normal skin, the rash may be lighter or darker than your normal skin.  This rash is harmless and usually causes no symptoms. The only reason for treatment is to improve appearance. Follow the advice below to clear the rash. It might take several months for normal skin color to return.  Home care    Use a medicated dandruff shampoo over your whole body while in the shower. Don t use soap. Let the shampoo stay on for at least a few minutes before rinsing off. Do this every day for 4 weeks.    As a different treatment, you may buy an antifungal cream (miconazole or clotrimazole, both available without a prescription). Use this 2 times a day for 7 days.     This rash is not contagious to others. It can t be  spread if someone touches it. So you don t have to worry about exposing others at school, , or work.  Prevention  This fungus can come back again (recur) after treatment. To prevent return of the rash, use medicated dandruff shampoo over your whole body when in the shower. Do this once a month for the next year. This is very important to do in the summertime. That is when the rash is most likely to recur.  Other prevention tips include:    Avoid oily skin products    Wear loose clothing. Try to let your skin stay cool and breathe.    Use sunscreen and protect yourself from sunlight    Avoid tanning beds  Follow-up care  Follow up with your healthcare provider, or as advised. Call your provider if the rash doesn t get better with the above treatment, or if new symptoms appear.  When to seek medical advice  Call your healthcare provider right away if any of these occur:    Increasing redness of the rash    Change in appearance of the rash    Fever of 100.4 F (38 C) or higher, or as directed by your provider  Date Last Reviewed: 8/1/2016 2000-2018 [employer]. [address] [place] [address]. All rights reserved. This information is not intended as a substitute for professional medical care. Always follow your healthcare professional's instructions.

## 2019-10-29 ENCOUNTER — TELEPHONE (OUTPATIENT)
Dept: FAMILY MEDICINE | Facility: OTHER | Age: 34
End: 2019-10-29

## 2019-10-29 DIAGNOSIS — I10 ESSENTIAL HYPERTENSION: ICD-10-CM

## 2019-10-29 RX ORDER — LISINOPRIL 10 MG/1
10 TABLET ORAL DAILY
Qty: 30 TABLET | Refills: 0 | Status: SHIPPED | OUTPATIENT
Start: 2019-10-29 | End: 2019-11-10

## 2019-10-29 NOTE — TELEPHONE ENCOUNTER
Pt cant come except on Fridays--States sees you for BP medication---he is all out and cant get in until Nov.8th he would like a refill for BP Meds sent to [facility]'s so he doesn't have to go many days without

## 2019-10-29 NOTE — TELEPHONE ENCOUNTER
After proper verification, patients emergency contact requested a refill of Lisinopril until his next appointment on Nov. 8th.  Radha Cordova LPN on 10/29/2019 at 1:47 PM

## 2019-11-10 DIAGNOSIS — I10 ESSENTIAL HYPERTENSION: Primary | ICD-10-CM

## 2019-11-10 DIAGNOSIS — I10 ESSENTIAL HYPERTENSION: ICD-10-CM

## 2019-11-12 RX ORDER — LISINOPRIL 10 MG/1
TABLET ORAL
Qty: 30 TABLET | Refills: 0 | OUTPATIENT
Start: 2019-11-12

## 2019-11-12 NOTE — TELEPHONE ENCOUNTER
[facility] sent Rx request for the following:      LISINOPRIL 10MG TABLETS  Sig: TAKE 1 TABLET BY MOUTH ONCE DAILY  Last Prescription Date:   10/29/19  Last Fill Qty/Refills:         30, R-0    Last Office Visit:              10/19/18 ([facility])  Future Office visit:           None.  ACE Inhibitors (Including Combos) Protocol [id number] 11:51 AM   Blood pressure under 140/90 in past 12 months  BP Readings from Last 3 Encounters:   08/11/19 (!) 164/92   01/18/19 140/80   10/19/18 154/90       Normal serum creatinine on file in past 12 months    Normal serum potassium on file in past 12 months     Per telephone encounter dated 10/29, Pt requested emergency supply to get through until appointment on 11/8. Pt was no-show for appointment and did not reschedule. Pt has no PCP at [facility].    Pt will be due for refill on 11/27.    Attempted reaching Pt, to assist him in scheduling appointment, prior to running out of medication. Left message on machine to call back. Sylvie Diaz RN .............. 11/12/2019  12:32 PM

## 2019-11-12 NOTE — TELEPHONE ENCOUNTER
Patient's chart was accessed to determine the status of a refill request - nothing needed at this time as the request was previously addressed.     Duplicate refill request refused. Sylvie Diaz RN .............. 11/12/2019  11:58 AM

## 2019-11-14 RX ORDER — LISINOPRIL 10 MG/1
TABLET ORAL
Qty: 25 TABLET | Refills: 0 | Status: SHIPPED | OUTPATIENT
Start: 2019-11-14 | End: 2020-03-23

## 2019-11-14 NOTE — TELEPHONE ENCOUNTER
Unable to reach Patient.     In clinical absence of Luci Mckeon or new PCP, patient is requesting that this message be sent to the primary provider's Teamlet for consideration please.      Sylvie Diaz RN .............. 11/14/2019  12:46 PM

## 2020-03-23 ENCOUNTER — VIRTUAL VISIT (OUTPATIENT)
Dept: FAMILY MEDICINE | Facility: OTHER | Age: 35
End: 2020-03-23
Attending: FAMILY MEDICINE

## 2020-03-23 DIAGNOSIS — I10 ESSENTIAL HYPERTENSION: ICD-10-CM

## 2020-03-23 DIAGNOSIS — B36.0 TINEA VERSICOLOR: ICD-10-CM

## 2020-03-23 PROCEDURE — 99212 OFFICE O/P EST SF 10 MIN: CPT | Mod: TEL | Performed by: FAMILY MEDICINE

## 2020-03-23 RX ORDER — LISINOPRIL 10 MG/1
10 TABLET ORAL DAILY
Qty: 90 TABLET | Refills: 11 | Status: SHIPPED | OUTPATIENT
Start: 2020-03-23 | End: 2023-04-12

## 2020-03-23 RX ORDER — FLUCONAZOLE 150 MG/1
TABLET ORAL
Qty: 4 TABLET | Refills: 3 | Status: SHIPPED | OUTPATIENT
Start: 2020-03-23 | End: 2021-02-23

## 2020-03-23 RX ORDER — KETOCONAZOLE 20 MG/G
CREAM TOPICAL DAILY
Qty: 30 G | Refills: 4 | Status: SHIPPED | OUTPATIENT
Start: 2020-03-23 | End: 2021-10-20

## 2020-03-23 NOTE — PROGRESS NOTES
"Jax Rojo is a 34 year old male who is being evaluated via a billable telephone visit.      The patient has been notified of following:     \"This telephone visit will be conducted via a call between you and your physician/provider. We have found that certain health care needs can be provided without the need for a physical exam.  This service lets us provide the care you need with a short phone conversation.  If a prescription is necessary we can send it directly to your pharmacy.  If lab work is needed we can place an order for that and you can then stop by our lab to have the test done at a later time.    If during the course of the call the physician/provider feels a telephone visit is not appropriate, you will not be charged for this service.\"     Jax Rojo complains of    Chief Complaint   Patient presents with     Recheck Medication     I last saw this patient 4 years ago.    He has a history of tinea versicolor.  He has been treated with Nizoral tablets in the past.He was treated at [facility] with Sporanox in 2017.  Rash has flared significantly and is up onto his neck.    He has hypertension for which he has been taking lisinopril.  He has not been taking blood pressure at home.  His arms are too big for most cuffs.  He has been out of blood pressure medication for a week.  He doesn't have insurance.     He has a history of hepatitis C and in 2016 we did labs and he had a significant viral load.  He has a history in the past of IV drug use. He has not followed through because of lack of insurance.    He has been to the eye doctor and is legally blind without glasses.     We referred him for consultation with a sleep specialist for years ago as well because of worry about ANA because of fatigue and hypertension.     I have reviewed and updated the patient's Past Medical History, Social History, Family History and Medication List.    ALLERGIES  Bee; Wasp venom protein; Chlorthalidone; Sulfamethoxazole " w/trimethoprim; and Sulfamethoxazole-trimethoprim    Patient Active Problem List   Diagnosis     Chondromalacia of knee     Closed fracture of radius     History of drug abuse in remission (H)     Essential hypertension     Obesity     Pityriasis versicolor     Chronic hepatitis C without hepatic coma (H)     ADHD (attention deficit hyperactivity disorder)     Anxiety state     Closed nondisplaced fracture of ulnar styloid with routine healing     Drug dependence (H)     Knee pain          Assessment/Plan:    Jax was seen today for recheck medication.    Diagnoses and all orders for this visit:    Tinea versicolor  -     fluconazole (DIFLUCAN) 150 MG tablet; Take 2 tabs together. Repeat in one week.  -     ketoconazole (NIZORAL) 2 % external cream; Apply topically daily    Essential hypertension  -     lisinopril (ZESTRIL) 10 MG tablet; Take 1 tablet (10 mg) by mouth daily       Instructed that he needs a visit once he has insurance.  He really should have labs done since he continues to take his lisinopril.  Renewal on lisinopril 10 mg daily.  Were uncertain what his blood pressure has been so again recommended follow-up.    Fluconazole 2 tabs repeated again in 1 week for tinea versicolor.  Prescription for ketoconazole cream to apply as well.    Encouraged to seek appointment for a physical in the future as well as follow through with evaluation and treatment for chronic hepatitis C with gastroenterology.    Phone call duration:  8 minutes    Tommy Hernandez MD

## 2020-07-07 ENCOUNTER — TELEPHONE (OUTPATIENT)
Dept: FAMILY MEDICINE | Facility: OTHER | Age: 35
End: 2020-07-07

## 2020-07-07 ENCOUNTER — OFFICE VISIT (OUTPATIENT)
Dept: FAMILY MEDICINE | Facility: OTHER | Age: 35
End: 2020-07-07
Attending: FAMILY MEDICINE
Payer: MEDICAID

## 2020-07-07 VITALS
RESPIRATION RATE: 16 BRPM | DIASTOLIC BLOOD PRESSURE: 88 MMHG | TEMPERATURE: 98.8 F | HEART RATE: 108 BPM | WEIGHT: 243 LBS | SYSTOLIC BLOOD PRESSURE: 152 MMHG | HEIGHT: 70 IN | BODY MASS INDEX: 34.79 KG/M2 | OXYGEN SATURATION: 98 %

## 2020-07-07 DIAGNOSIS — B18.2 CHRONIC HEPATITIS C WITHOUT HEPATIC COMA (H): ICD-10-CM

## 2020-07-07 DIAGNOSIS — F10.10 ALCOHOL ABUSE: ICD-10-CM

## 2020-07-07 DIAGNOSIS — I10 ESSENTIAL HYPERTENSION: ICD-10-CM

## 2020-07-07 DIAGNOSIS — I60.9 SUBARACHNOID HEMORRHAGE (H): Primary | ICD-10-CM

## 2020-07-07 DIAGNOSIS — F19.20 CHEMICAL DEPENDENCY (H): ICD-10-CM

## 2020-07-07 PROCEDURE — 99214 OFFICE O/P EST MOD 30 MIN: CPT | Performed by: FAMILY MEDICINE

## 2020-07-07 ASSESSMENT — PAIN SCALES - GENERAL: PAINLEVEL: MILD PAIN (2)

## 2020-07-07 ASSESSMENT — MIFFLIN-ST. JEOR: SCORE: 2044.74
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
## 2020-07-07 NOTE — TELEPHONE ENCOUNTER
He needs to be put into an appointment. I'm willing to see him.  Tommy Hernandez MD on 7/7/2020 at 1:13 PM

## 2020-07-07 NOTE — TELEPHONE ENCOUNTER
I can't see any records from an ER visit, so can't really comment.  If he was told he needed follow up, he should see someone, even if I don't have a spot.  Tommy Hernandez MD on 7/7/2020 at 1:11 PM
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
## 2020-11-24 ENCOUNTER — ALLIED HEALTH/NURSE VISIT (OUTPATIENT)
Dept: FAMILY MEDICINE | Facility: OTHER | Age: 35
End: 2020-11-24
Attending: FAMILY MEDICINE
Payer: COMMERCIAL

## 2020-11-24 DIAGNOSIS — J02.9 SORE THROAT: Primary | ICD-10-CM

## 2020-11-24 PROCEDURE — 99207 PR NO CHARGE NURSE ONLY: CPT

## 2020-11-24 PROCEDURE — C9803 HOPD COVID-19 SPEC COLLECT: HCPCS

## 2020-11-24 PROCEDURE — U0003 INFECTIOUS AGENT DETECTION BY NUCLEIC ACID (DNA OR RNA); SEVERE ACUTE RESPIRATORY SYNDROME CORONAVIRUS 2 (SARS-COV-2) (CORONAVIRUS DISEASE [COVID-19]), AMPLIFIED PROBE TECHNIQUE, MAKING USE OF HIGH THROUGHPUT TECHNOLOGIES AS DESCRIBED BY CMS-2020-01-R: HCPCS | Mod: ZL | Performed by: FAMILY MEDICINE

## 2020-11-24 NOTE — NURSING NOTE
Chief Complaint   Patient presents with     Covid 19 Testing     headache, sore throat, fever, vomiting       Patient swabbed for COVID-19 testing.  Cristian Workman LPN on 11/24/2020 at 2:43 PM

## 2020-11-24 NOTE — LETTER
[facility]  [address]  [place] [address]  [phone]      2020      [place] [address]          Dear Jax Rojo,    Thank you for your interest in becoming a [employer] user.     Please access the [employer] web site at [email or web address][email or web address].     Your access code is: [email or web address]  Expires: 2021  2:36 PM    If you allow your access code to , or if you have any questions please call the [employer] representative at your primary clinic during normal clinic hours.     Sincerely,  [facility]

## 2020-11-26 LAB
SARS-COV-2 RNA SPEC QL NAA+PROBE: NOT DETECTED
SPECIMEN SOURCE: NORMAL

## 2020-12-27 ENCOUNTER — HEALTH MAINTENANCE LETTER (OUTPATIENT)
Age: 35
End: 2020-12-27

## 2021-02-23 ENCOUNTER — MYC MEDICAL ADVICE (OUTPATIENT)
Dept: FAMILY MEDICINE | Facility: OTHER | Age: 36
End: 2021-02-23

## 2021-02-23 DIAGNOSIS — B36.0 TINEA VERSICOLOR: ICD-10-CM

## 2021-02-23 RX ORDER — FLUCONAZOLE 150 MG/1
TABLET ORAL
Qty: 4 TABLET | Refills: 3 | Status: SHIPPED | OUTPATIENT
Start: 2021-02-23 | End: 2021-10-20

## 2021-10-09 ENCOUNTER — HEALTH MAINTENANCE LETTER (OUTPATIENT)
Age: 36
End: 2021-10-09

## 2021-10-20 ENCOUNTER — OFFICE VISIT (OUTPATIENT)
Dept: FAMILY MEDICINE | Facility: OTHER | Age: 36
End: 2021-10-20
Attending: PHYSICIAN ASSISTANT
Payer: COMMERCIAL

## 2021-10-20 VITALS
TEMPERATURE: 99 F | OXYGEN SATURATION: 96 % | RESPIRATION RATE: 20 BRPM | WEIGHT: 253.31 LBS | SYSTOLIC BLOOD PRESSURE: 132 MMHG | BODY MASS INDEX: 36.26 KG/M2 | HEIGHT: 70 IN | HEART RATE: 101 BPM | DIASTOLIC BLOOD PRESSURE: 70 MMHG

## 2021-10-20 DIAGNOSIS — R11.2 NAUSEA AND VOMITING, INTRACTABILITY OF VOMITING NOT SPECIFIED, UNSPECIFIED VOMITING TYPE: ICD-10-CM
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
## 2021-10-20 DIAGNOSIS — J06.9 VIRAL URI WITH COUGH: ICD-10-CM

## 2021-10-20 DIAGNOSIS — S30.861A TICK BITE OF ABDOMINAL WALL, INITIAL ENCOUNTER: Primary | ICD-10-CM

## 2021-10-20 PROCEDURE — C9803 HOPD COVID-19 SPEC COLLECT: HCPCS

## 2021-10-20 PROCEDURE — G0463 HOSPITAL OUTPT CLINIC VISIT: HCPCS | Mod: 25

## 2021-10-20 PROCEDURE — U0003 INFECTIOUS AGENT DETECTION BY NUCLEIC ACID (DNA OR RNA); SEVERE ACUTE RESPIRATORY SYNDROME CORONAVIRUS 2 (SARS-COV-2) (CORONAVIRUS DISEASE [COVID-19]), AMPLIFIED PROBE TECHNIQUE, MAKING USE OF HIGH THROUGHPUT TECHNOLOGIES AS DESCRIBED BY CMS-2020-01-R: HCPCS | Mod: ZL | Performed by: NURSE PRACTITIONER

## 2021-10-20 PROCEDURE — 99213 OFFICE O/P EST LOW 20 MIN: CPT | Performed by: NURSE PRACTITIONER

## 2021-10-20 PROCEDURE — G0463 HOSPITAL OUTPT CLINIC VISIT: HCPCS

## 2021-10-20 RX ORDER — DOXYCYCLINE 100 MG/1
200 CAPSULE ORAL ONCE
Qty: 2 CAPSULE | Refills: 0 | Status: SHIPPED | OUTPATIENT
Start: 2021-10-20 | End: 2021-10-20

## 2021-10-20 ASSESSMENT — PAIN SCALES - GENERAL: PAINLEVEL: MODERATE PAIN (5)

## 2021-10-20 ASSESSMENT — MIFFLIN-ST. JEOR: SCORE: 2085.27

## 2021-10-20 NOTE — LETTER
October 20, 2021                                                                     To Whom it May Concern:    Jax Rojo attended clinic here on Oct 20, 2021. Covid test is pending. Should be out of work until results return. If positive, quarantine for 10 days following onset of symptoms.       Sincerely,    Geraldine Miramontes NP

## 2021-10-20 NOTE — NURSING NOTE
Patient presents to clinic experiencing emesis, cough, body aches, nausea and sinus drainage since yesterday.  Medication Reconciliation: complete    Sylvie Kilpatrick LPN

## 2021-10-20 NOTE — PATIENT INSTRUCTIONS
Symptoms consistent with viral illness right now and no antibiotics are indicated. Continue symptomatic treatment. covid is pending.     Symptomatic treatment - Encouraged fluids, salt water gargles, honey (only if greater than 1 year in age due to risk of botulism), elevation, humidifier, sinus rinse/netti pot, lozenges, tea, topical vapor rub, popsicles, rest, etc     Take one time dose of doxycycline for recent tick bite.

## 2021-10-20 NOTE — PROGRESS NOTES
"ASSESSMENT/PLAN:    I have reviewed the nursing notes.  I have reviewed the findings, diagnosis, plan and need for follow up with the patient.    1. Emesis/ nausea   - Symptomatic COVID-19 Virus (Coronavirus) by PCR Nose  Symptoms have been improving, covid test is pending.     2. Tick bite of abdominal wall, initial encounter  - doxycycline hyclate (VIBRAMYCIN) 100 MG capsule; Take 2 capsules (200 mg) by mouth once for 1 dose  Dispense: 2 capsule; Refill: 0    3. Viral URI with cough  Strong suspicion for covid 19.   Symptoms and exam consistent with viral URI at this time, no antibiotics are indicated. Treat symptomatically and follow up if symptoms persist or worsen. Provided reassurance that lungs are clear on exam and no concern for pneumonia. Oxygen is adequate at 96%.   -Symptomatic treatment - Encouraged fluids, salt water gargles, honey (only if greater than 1 year in age due to risk of botulism), elevation, humidifier, sinus rinse/netti pot, lozenges, tea, topical vapor rub, popsicles, rest, etc   -May use over-the-counter Tylenol or ibuprofen PRN    Discussed warning signs/symptoms indicative of need to f/u    Follow up if symptoms persist or worsen or concerns    I explained my diagnostic considerations and recommendations to the patient, who voiced understanding and agreement with the treatment plan. All questions were answered. We discussed potential side effects of any prescribed or recommended therapies, as well as expectations for response to treatments.    Geraldine Miramontes NP  10/20/2021  3:46 PM    HPI:  Jax Rojo is a 36 year old male who presents to [facility] today for concerns of emesis, cough, body aches, nausea, and sinus drainage since yesterday. Swabbed for covid today. Feels \"miserable.\" Whole body hurts. Couldn't go to work due to discomfort. Wonders if ear infection. No known fevers. Right ear is the one that hurts. Left ear is ok. Throat is really sore too. No chest pain or " shortness of breath. Difficult to breathe through nose. No changes to hearing or ringing in the ears. Wears ear plugs at work, some 'black drainage' but works in [employer], lots of dust.     Also had a tick bite on his side 2 days ago. Little tick, embedded - likely deer tick. Removed it 2 days ago. It is possible it was attached for > 36 hours.         Past Medical History:   Diagnosis Date     Chondromalacia of knee     2014     Encounter for other administrative examinations     3/5/2013     Essential (primary) hypertension     3/5/2013     Muscle spasm of back     3/5/2013     Obesity     No Comments Provided     Other psychoactive substance abuse, uncomplicated (H)     2007     Personal history of other medical treatment (CODE)     No Comments Provided     Pityriasis versicolor     3/8/2012     Past Surgical History:   Procedure Laterality Date     ADENOIDECTOMY           ARTHROSCOPY KNEE      10/19/05,Right knee surgery, ACL reconstruction, MCL and meniscectomy,Dr. Bai     OTHER SURGICAL HISTORY      ,[id number],INCISION AND DRAINAGE,I & D chin abscess     TYMPANOSTOMY, LOCAL/TOPICAL ANESTHESIA      , 10/86, , ,PE tube placement     VASECTOMY           Social History     Tobacco Use     Smoking status: Current Every Day Smoker     Packs/day: 0.25     Years: 2.00     Pack years: 0.50     Types: Cigarettes     Smokeless tobacco: Current User     Types: Chew, Snuff     Tobacco comment: Quit smokin Tin per week encouraged to quit   Substance Use Topics     Alcohol use: Yes     Alcohol/week: 0.0 standard drinks     Comment: Alcoholic Drinks/day: Rare use     Current Outpatient Medications   Medication Sig Dispense Refill     EPINEPHrine (EPIPEN/ADRENACLICK/OR ANY BX GENERIC EQUIV) 0.3 MG/0.3ML injection 2-pack Inject 0.3 mg into the muscle       lisinopril (ZESTRIL) 10 MG tablet Take 1 tablet (10 mg) by mouth daily 90 tablet 11     Allergies   Allergen Reactions     Bee Anaphylaxis  "    Other reaction(s): Wheezing     Wasp Venom Protein Anaphylaxis     Carries an epi pen     Chlorthalidone      Other reaction(s): Other - Describe In Comment Field  Easy Sunburn when outside - works as .      Sulfamethoxazole W/Trimethoprim Hives and Rash     Sulfamethoxazole-Trimethoprim Rash     Past medical history, past surgical history, current medications and allergies reviewed and accurate to the best of my knowledge.      ROS:  Refer to HPI    /70 (BP Location: Right arm, Patient Position: Sitting, Cuff Size: Adult Large)   Pulse 101   Temp 99  F (37.2  C) (Tympanic)   Resp 20   Ht 1.778 m (5' 10\")   Wt 114.9 kg (253 lb 5 oz)   SpO2 96%   BMI 36.35 kg/m      EXAM:  General Appearance: Well appearing 36 year old male, appropriate appearance for age. No acute distress  Ears: Left TM intact, translucent with bony landmarks appreciated, no erythema, no effusion, no bulging, no purulence.  Right TM intact, translucent with bony landmarks appreciated, no erythema, no effusion, no bulging, no purulence.  Left auditory canal clear. Right auditory canal clear.  Normal external ears, non tender.  Eyes: conjunctivae normal without erythema or irritation, corneas clear, no drainage or crusting, no eyelid swelling, pupils equal   Orophayrnx: moist mucous membranes, posterior pharynx without erythema, tonsils without hypertrophy, no erythema, no exudates or petechiae, no post nasal drip seen, no trismus, voice clear.    Sinuses:  + sinus tenderness upon palpation of the maxillary sinuses  Nose:  Bilateral nares: no erythema, no edema, no drainage or congestion   Neck: supple without adenopathy  Respiratory: normal chest wall and respirations.  Normal effort.  Clear to auscultation bilaterally, no wheezing, crackles or rhonchi. No increased work of breathing.  + dry cough.  Cardiac: RRR with no murmurs  Abdomen: soft, nontender, no rigidity, no rebound tenderness or guarding, normal bowel sounds " present  Musculoskeletal:  Equal movement of bilateral upper extremities.  Equal movement of bilateral lower extremities.  Normal gait.    Dermatological: no rashes noted of exposed skin  Psychological: normal affect, alert, oriented, and pleasant. \

## 2021-10-21 LAB — SARS-COV-2 RNA RESP QL NAA+PROBE: POSITIVE

## 2022-01-29 ENCOUNTER — HEALTH MAINTENANCE LETTER (OUTPATIENT)
Age: 37
End: 2022-01-29

## 2022-09-17 ENCOUNTER — HEALTH MAINTENANCE LETTER (OUTPATIENT)
Age: 37
End: 2022-09-17

## 2023-04-12 ENCOUNTER — OFFICE VISIT (OUTPATIENT)
Dept: FAMILY MEDICINE | Facility: OTHER | Age: 38
End: 2023-04-12
Attending: PHYSICIAN ASSISTANT
Payer: COMMERCIAL

## 2023-04-12 VITALS
BODY MASS INDEX: 31.71 KG/M2 | RESPIRATION RATE: 22 BRPM | SYSTOLIC BLOOD PRESSURE: 150 MMHG | WEIGHT: 221 LBS | OXYGEN SATURATION: 97 % | TEMPERATURE: 98.3 F | DIASTOLIC BLOOD PRESSURE: 90 MMHG | HEART RATE: 81 BPM

## 2023-04-12 DIAGNOSIS — Z11.3 SCREEN FOR STD (SEXUALLY TRANSMITTED DISEASE): ICD-10-CM

## 2023-04-12 DIAGNOSIS — Z13.1 SCREENING FOR DIABETES MELLITUS: ICD-10-CM

## 2023-04-12 DIAGNOSIS — R73.09 ELEVATED GLUCOSE: ICD-10-CM

## 2023-04-12 DIAGNOSIS — Z13.220 SCREENING CHOLESTEROL LEVEL: ICD-10-CM

## 2023-04-12 DIAGNOSIS — F10.11 HISTORY OF ALCOHOL ABUSE: ICD-10-CM

## 2023-04-12 DIAGNOSIS — I10 ESSENTIAL HYPERTENSION: ICD-10-CM

## 2023-04-12 DIAGNOSIS — B18.2 CHRONIC HEPATITIS C WITHOUT HEPATIC COMA (H): Primary | ICD-10-CM

## 2023-04-12 DIAGNOSIS — R79.89 ELEVATED LFTS: ICD-10-CM

## 2023-04-12 DIAGNOSIS — Z23 NEED FOR VACCINATION FOR PNEUMOCOCCUS: ICD-10-CM

## 2023-04-12 DIAGNOSIS — B36.0 TINEA VERSICOLOR: ICD-10-CM

## 2023-04-12 PROBLEM — F12.20 TETRAHYDROCANNABINOL (THC) DEPENDENCE (H): Status: ACTIVE | Noted: 2022-07-26

## 2023-04-12 LAB
ALBUMIN SERPL BCG-MCNC: 4.4 G/DL (ref 3.5–5.2)
ALP SERPL-CCNC: 151 U/L (ref 40–129)
ALT SERPL W P-5'-P-CCNC: 187 U/L (ref 10–50)
ANION GAP SERPL CALCULATED.3IONS-SCNC: 10 MMOL/L (ref 7–15)
AST SERPL W P-5'-P-CCNC: 104 U/L (ref 10–50)
BASOPHILS # BLD AUTO: 0 10E3/UL (ref 0–0.2)
BASOPHILS NFR BLD AUTO: 1 %
BILIRUB SERPL-MCNC: 0.3 MG/DL
BUN SERPL-MCNC: 6.7 MG/DL (ref 6–20)
C TRACH DNA SPEC QL PROBE+SIG AMP: NEGATIVE
CALCIUM SERPL-MCNC: 9.3 MG/DL (ref 8.6–10)
CHLORIDE SERPL-SCNC: 103 MMOL/L (ref 98–107)
CHOLEST SERPL-MCNC: 147 MG/DL
CREAT SERPL-MCNC: 0.61 MG/DL (ref 0.67–1.17)
DEPRECATED HCO3 PLAS-SCNC: 24 MMOL/L (ref 22–29)
EOSINOPHIL # BLD AUTO: 0.1 10E3/UL (ref 0–0.7)
EOSINOPHIL NFR BLD AUTO: 2 %
ERYTHROCYTE [DISTWIDTH] IN BLOOD BY AUTOMATED COUNT: 12.4 % (ref 10–15)
GFR SERPL CREATININE-BSD FRML MDRD: >90 ML/MIN/1.73M2
GLUCOSE SERPL-MCNC: 99 MG/DL (ref 70–99)
HBA1C MFR BLD: 5.1 % (ref 4–6.2)
HCT VFR BLD AUTO: 46 % (ref 40–53)
HDLC SERPL-MCNC: 34 MG/DL
HGB BLD-MCNC: 16.9 G/DL (ref 13.3–17.7)
IMM GRANULOCYTES # BLD: 0 10E3/UL
IMM GRANULOCYTES NFR BLD: 0 %
LDLC SERPL CALC-MCNC: 96 MG/DL
LIPASE SERPL-CCNC: 24 U/L (ref 13–60)
LYMPHOCYTES # BLD AUTO: 3 10E3/UL (ref 0.8–5.3)
LYMPHOCYTES NFR BLD AUTO: 43 %
MCH RBC QN AUTO: 33.8 PG (ref 26.5–33)
MCHC RBC AUTO-ENTMCNC: 36.7 G/DL (ref 31.5–36.5)
MCV RBC AUTO: 92 FL (ref 78–100)
MONOCYTES # BLD AUTO: 0.5 10E3/UL (ref 0–1.3)
MONOCYTES NFR BLD AUTO: 7 %
N GONORRHOEA DNA SPEC QL NAA+PROBE: NEGATIVE
NEUTROPHILS # BLD AUTO: 3.3 10E3/UL (ref 1.6–8.3)
NEUTROPHILS NFR BLD AUTO: 47 %
NONHDLC SERPL-MCNC: 113 MG/DL
NRBC # BLD AUTO: 0 10E3/UL
NRBC BLD AUTO-RTO: 0 /100
PLATELET # BLD AUTO: 144 10E3/UL (ref 150–450)
POTASSIUM SERPL-SCNC: 4.1 MMOL/L (ref 3.4–5.3)
PROT SERPL-MCNC: 8.3 G/DL (ref 6.4–8.3)
RBC # BLD AUTO: 5 10E6/UL (ref 4.4–5.9)
SODIUM SERPL-SCNC: 137 MMOL/L (ref 136–145)
TRIGL SERPL-MCNC: 86 MG/DL
WBC # BLD AUTO: 7 10E3/UL (ref 4–11)

## 2023-04-12 PROCEDURE — 80061 LIPID PANEL: CPT | Mod: ZL | Performed by: PHYSICIAN ASSISTANT

## 2023-04-12 PROCEDURE — 86706 HEP B SURFACE ANTIBODY: CPT | Mod: ZL | Performed by: PHYSICIAN ASSISTANT

## 2023-04-12 PROCEDURE — 86803 HEPATITIS C AB TEST: CPT | Mod: ZL | Performed by: PHYSICIAN ASSISTANT

## 2023-04-12 PROCEDURE — 80053 COMPREHEN METABOLIC PANEL: CPT | Mod: ZL | Performed by: PHYSICIAN ASSISTANT

## 2023-04-12 PROCEDURE — 99214 OFFICE O/P EST MOD 30 MIN: CPT | Performed by: PHYSICIAN ASSISTANT

## 2023-04-12 PROCEDURE — 87340 HEPATITIS B SURFACE AG IA: CPT | Mod: ZL | Performed by: PHYSICIAN ASSISTANT

## 2023-04-12 PROCEDURE — 87491 CHLMYD TRACH DNA AMP PROBE: CPT | Mod: ZL | Performed by: PHYSICIAN ASSISTANT

## 2023-04-12 PROCEDURE — 87522 HEPATITIS C REVRS TRNSCRPJ: CPT | Mod: ZL | Performed by: PHYSICIAN ASSISTANT

## 2023-04-12 PROCEDURE — 87902 NFCT AGT GNTYP ALYS HEP C: CPT | Mod: ZL | Performed by: PHYSICIAN ASSISTANT

## 2023-04-12 PROCEDURE — 83036 HEMOGLOBIN GLYCOSYLATED A1C: CPT | Mod: ZL | Performed by: PHYSICIAN ASSISTANT

## 2023-04-12 PROCEDURE — 90677 PCV20 VACCINE IM: CPT

## 2023-04-12 PROCEDURE — G0463 HOSPITAL OUTPT CLINIC VISIT: HCPCS | Mod: 25

## 2023-04-12 PROCEDURE — 87389 HIV-1 AG W/HIV-1&-2 AB AG IA: CPT | Mod: ZL | Performed by: PHYSICIAN ASSISTANT

## 2023-04-12 PROCEDURE — 83690 ASSAY OF LIPASE: CPT | Mod: ZL | Performed by: PHYSICIAN ASSISTANT

## 2023-04-12 PROCEDURE — 86780 TREPONEMA PALLIDUM: CPT | Mod: ZL | Performed by: PHYSICIAN ASSISTANT

## 2023-04-12 PROCEDURE — 36415 COLL VENOUS BLD VENIPUNCTURE: CPT | Mod: ZL | Performed by: PHYSICIAN ASSISTANT

## 2023-04-12 PROCEDURE — 85004 AUTOMATED DIFF WBC COUNT: CPT | Mod: ZL | Performed by: PHYSICIAN ASSISTANT

## 2023-04-12 RX ORDER — FLUCONAZOLE 150 MG/1
150 TABLET ORAL ONCE
Qty: 2 TABLET | Refills: 0 | Status: SHIPPED | OUTPATIENT
Start: 2023-04-12 | End: 2023-08-29

## 2023-04-12 RX ORDER — LISINOPRIL 20 MG/1
20 TABLET ORAL DAILY
Qty: 90 TABLET | Refills: 3 | Status: SHIPPED | OUTPATIENT
Start: 2023-04-12

## 2023-04-12 RX ORDER — KETOCONAZOLE 20 MG/ML
SHAMPOO TOPICAL
Qty: 120 ML | Refills: 11 | Status: SHIPPED | OUTPATIENT
Start: 2023-04-12

## 2023-04-12 ASSESSMENT — ENCOUNTER SYMPTOMS
FEVER: 0
PSYCHIATRIC NEGATIVE: 1
DIZZINESS: 0
SHORTNESS OF BREATH: 0
DIARRHEA: 0
CHILLS: 0
WHEEZING: 0
MYALGIAS: 0
VOMITING: 0
ABDOMINAL PAIN: 0
COUGH: 0
CONSTIPATION: 0
PALPITATIONS: 0
SORE THROAT: 0
NAUSEA: 0

## 2023-04-12 ASSESSMENT — PAIN SCALES - GENERAL: PAINLEVEL: SEVERE PAIN (6)

## 2023-04-12 NOTE — NURSING NOTE
Pt presents to clinic today for a rash that comes and goes for the last week. States she has severe back pain to where he can barely stand up .       FOOD SECURITY SCREENING QUESTIONS:    The next two questions are to help us understand your food security.  If you are feeling you need any assistance in this area, we have resources available to support you today.    Hunger Vital Signs:  Within the past 12 months we worried whether our food would run out before we got money to buy more. Never  Within the past 12 months the food we bought just didn't last and we didn't have money to get more. Never          Medication Reconciliation: complete  Suzanne Keane, ANTWAN,LPN on 4/12/2023 at 11:50 AM

## 2023-04-12 NOTE — PATIENT INSTRUCTIONS
Blood pressure is elevated today. Increased lisinopril to 20 mg daily.   Encouraged to monitor blood pressure a couple times a week over the next few weeks. Return in 3-4 weeks for a recheck appointment. Work on diet and exercise to decrease blood pressure. Weight loss is helpful.  Decrease salt intake.      -- Learn about DASH Diet (http://bit.ly/DASHDiet - redirects to the [facility]) for dietary ways to reduce blood pressure    Ordered abdominal ultrasound.       Low-Salt Choices  Eating salt (sodium) can make your body retain too much water. Extra water makes your heart work harder. Canned, packaged, and frozen foods are easy to prepare. But they are often high in sodium. Here are some ideas for low-salt foods you can easily make yourself.   For breakfast  Fruit or 100% fruit juice. It's better to have whole fruit instead of 100% fruit juice.  Whole-wheat bread or an English muffin. Look for sodium content on Nutrition Facts labels.  Low-fat milk or yogurt  Unsalted eggs  Shredded wheat  Corn tortillas  Unsalted steamed rice  Regular (not instant) hot cereal, made without salt  Stay away from  Sausage, clemente, and ham  Flour tortillas  Packaged muffins, pancakes, and biscuits  Instant hot cereals  Cottage cheese     For lunch and dinner  Fresh fish, chicken, turkey, or meat--baked, broiled, or roasted without salt  Dry beans, cooked without salt  Tofu, stir-fried without salt  Unsalted fresh fruit and vegetables, or frozen or canned fruit and vegetables with no added salt  Stay away from  Lunch or deli meat that is cured or smoked  Cheese  Tomato juice and ketchup  Canned vegetables, soups, and fish not labeled as no-salt-added or reduced sodium  Packaged gravies and sauces  Olives, pickles, and relish  Bottled salad dressings     For snacks and desserts  Yogurt  Unsalted, air-popped popcorn  Unsalted nuts or seeds  Stay away from  Pies and cakes  Packaged dessert mixes  Pizza  Canned and packaged puddings  Pretzels,  chips, crackers, and nuts--unless the label says unsalted     [employer] last reviewed this educational content on 11/1/2019 2000-2021 [employer]. All rights reserved. This information is not intended as a substitute for professional medical care. Always follow your healthcare professional's instructions.

## 2023-04-12 NOTE — PROGRESS NOTES
Assessment & Plan   Problem List Items Addressed This Visit        Digestive    Chronic hepatitis C without hepatic coma (H) - Primary    Relevant Orders    Hepatitis C Screen Reflex to HCV RNA Quant and Genotype    Adult GI  Referral - Consult Only       Circulatory    Essential hypertension    Relevant Medications    lisinopril (ZESTRIL) 20 MG tablet    Other Relevant Orders    CBC and Differential (Completed)    Comprehensive Metabolic Panel       Other    Elevated LFTs    Relevant Orders    US Abdomen Limited   Other Visit Diagnoses     Screen for STD (sexually transmitted disease)        Relevant Orders    HIV Antigen Antibody Combo    Treponema Ab w Reflex to RPR and Titer    Hepatitis B Surface Antibody    Hepatitis B Surface Antigen    GC/Chlamydia by PCR    Screening cholesterol level        Relevant Orders    Lipid Panel    Screening for diabetes mellitus        Relevant Orders    Comprehensive Metabolic Panel    Elevated glucose        Relevant Orders    Comprehensive Metabolic Panel    Hemoglobin A1c (Completed)    History of alcohol abuse        Relevant Orders    CBC and Differential (Completed)    Comprehensive Metabolic Panel    Lipase    Hemoglobin A1c (Completed)    Tinea versicolor        Relevant Medications    fluconazole (DIFLUCAN) 150 MG tablet    ketoconazole (NIZORAL) 2 % external shampoo    Need for vaccination for pneumococcus        Relevant Orders    PNEUMOCOCCAL 20 VALENT CONJUGATE (PREVNAR 20) (Completed)         Chronic hepatitis C: Completed hepatitis C for monitoring.  Refer to gastroenterology for a consult to discuss hepatitis C treatment since he is now sober.    Completed a thorough STD screening.  Labs are pending.    Gave Prevnar 20 vaccine.    Tinea versicolor: Patient was given Diflucan along with ketoconazole shampoo for treatment.  Encouraged to keep the area clean and dry.  Recheck if symptoms or not improving or worsening.    History of alcohol abuse and  "elevated liver functions: Completed a thorough lab work-up to rule out concerns along with monitoring.  Completed cholesterol and diabetes mellitus screening.  Labs are pending.  Ordered abdominal ultrasound of the right upper quadrant to rule out concerns with a history of elevated liver functions and alcohol abuse.    Congratulated on quitting alcohol.    Hypertension: Increased lisinopril to 20 mg daily.  Encourage good diet and exercise.  Gave patient occasion.  Recheck in 3 to 4 weeks for monitoring.    Ordering of each unique test  Prescription drug management  30 minutes spent by me on the date of the encounter doing chart review, history and exam, documentation and further activities per the note       Nicotine/Tobacco Cessation:  He reports that he has been smoking cigarettes. He has a 0.50 pack-year smoking history. His smokeless tobacco use includes chew and snuff.  Nicotine/Tobacco Cessation Plan:   Information offered: Patient not interested at this time      See Patient Instructions    No follow-ups on file.    Iris Garrido PA-C  [facility]    Subjective   Jax is a 38 year old, presenting for the following health issues:  Rash (Tinea versicolor) and Pain (\"Cannot stand up today\")        4/12/2023    11:47 AM   Additional Questions   Roomed by rodríguez   Accompanied by self     Rash  Associated symptoms include a rash. Pertinent negatives include no abdominal pain, chest pain, chills, coughing, fever, myalgias, nausea, sore throat or vomiting.   Pain  Associated symptoms include a rash. Pertinent negatives include no abdominal pain, chest pain, chills, coughing, fever, myalgias, nausea, sore throat or vomiting.   History of Present Illness       Back Pain:  He presents for follow up of back pain. Patient's back pain is a recurring problem.  Location of back pain:  Right lower back and right side of neck  Description of back pain: dull ache  Back pain spreads: nowhere and left " shoulder    Since patient first noticed back pain, pain is: gradually worsening  Does back pain interfere with his job:  Not applicable      He eats 2-3 servings of fruits and vegetables daily.He consumes 0 sweetened beverage(s) daily.He exercises with enough effort to increase his heart rate 60 or more minutes per day.  He exercises with enough effort to increase his heart rate 7 days per week.   He is taking medications regularly.       Rash, severe back pain. Thinks its his liver and kidneys failing he states.     Patient is history of being a severe alcoholic.  States that his last alcoholic beverage was 217 days ago.  Has been in senior living as well.  Had many tattoos completed in senior living.  History of opioid use disorder.  Patient states that his recent liver functions were elevated.  Was also diagnosed with hepatitis C.  Has not went through treatment.  Wondering about getting this rechecked and getting a referral to gastroenterology for a consult.    Patient has history of tinea versicolor.  Typically gets a flareup once a year.  He states that he has spots now on his arms, chest, back, neck, and upper thighs.  History of ketoconazole shampoo and pills.  Currently flared for a week.  It is not itchy.    No history of cholesterol or diabetes screening however his blood sugars have been elevated in the past.  If he eats a lot of carbohydrates he tends to get sweaty.    Hypertension: Patient is currently taking lisinopril 10 mg daily.  Does not check his blood pressure at home.  Did not take his medication this morning.  No side effects noted with the medication.  Tolerates the medication well. Does not eat a lot of salt.     Due for pneumonia vaccine.    No recent cough or cold symptoms.    Review of Systems   Constitutional: Negative for chills and fever.   HENT: Negative for ear pain and sore throat.    Respiratory: Negative for cough, shortness of breath and wheezing.    Cardiovascular: Negative for chest pain and  palpitations.   Gastrointestinal: Negative for abdominal pain, constipation, diarrhea, nausea and vomiting.   Genitourinary: Negative.    Musculoskeletal: Negative for myalgias.   Skin: Positive for rash.   Neurological: Negative for dizziness.   Psychiatric/Behavioral: Negative.             Objective    BP (!) 158/95 (BP Location: Left arm, Patient Position: Sitting, Cuff Size: Adult Large)   Pulse 81   Temp 98.3  F (36.8  C) (Tympanic)   Resp 22   Wt 100.2 kg (221 lb)   SpO2 97%   BMI 31.71 kg/m    Body mass index is 31.71 kg/m .  Physical Exam  Vitals and nursing note reviewed.   Constitutional:       Appearance: Normal appearance.   Cardiovascular:      Rate and Rhythm: Normal rate and regular rhythm.      Heart sounds: Normal heart sounds.   Pulmonary:      Effort: Pulmonary effort is normal.      Breath sounds: Normal breath sounds.   Musculoskeletal:         General: Normal range of motion.   Skin:     General: Skin is warm and dry.      Comments: Many mildly erythematous papules appreciated scattered throughout the forearms, chest, and abdomen.  No open wound, pus, drainage, warmth.  Range between 3 and 10 mm in diameter.   Neurological:      General: No focal deficit present.      Mental Status: He is alert and oriented to person, place, and time.   Psychiatric:         Mood and Affect: Mood normal.         Behavior: Behavior normal.

## 2023-04-13 LAB
HBV SURFACE AB SERPL IA-ACNC: 471.3 M[IU]/ML
HBV SURFACE AB SERPL IA-ACNC: REACTIVE M[IU]/ML
HBV SURFACE AG SERPL QL IA: NONREACTIVE
HCV AB SERPL QL IA: REACTIVE
HIV 1+2 AB+HIV1 P24 AG SERPL QL IA: NONREACTIVE
T PALLIDUM AB SER QL: NONREACTIVE

## 2023-04-16 LAB
HCV RNA SERPL NAA+PROBE-ACNC: ABNORMAL IU/ML
HCV RNA SERPL NAA+PROBE-LOG IU: 6.7 {LOG_IU}/ML

## 2023-04-21 LAB — HCV GENTYP SERPL NAA+PROBE: NORMAL

## 2023-05-06 ENCOUNTER — HEALTH MAINTENANCE LETTER (OUTPATIENT)
Age: 38
End: 2023-05-06

## 2023-08-23 DIAGNOSIS — B36.0 TINEA VERSICOLOR: ICD-10-CM

## 2023-08-29 RX ORDER — FLUCONAZOLE 150 MG/1
TABLET ORAL
Qty: 2 TABLET | Refills: 3 | Status: SHIPPED | OUTPATIENT
Start: 2023-08-29

## 2024-07-13 ENCOUNTER — HEALTH MAINTENANCE LETTER (OUTPATIENT)
Age: 39
End: 2024-07-13

## 2025-07-19 ENCOUNTER — HEALTH MAINTENANCE LETTER (OUTPATIENT)
Age: 40
End: 2025-07-19

## (undated) RX ORDER — ONDANSETRON 2 MG/ML
INJECTION INTRAMUSCULAR; INTRAVENOUS
Status: DISPENSED
Start: 2018-06-10

## (undated) RX ORDER — FENTANYL CITRATE 50 UG/ML
INJECTION, SOLUTION INTRAMUSCULAR; INTRAVENOUS
Status: DISPENSED
Start: 2018-06-10